# Patient Record
Sex: FEMALE | Race: WHITE | NOT HISPANIC OR LATINO | Employment: UNEMPLOYED | URBAN - METROPOLITAN AREA
[De-identification: names, ages, dates, MRNs, and addresses within clinical notes are randomized per-mention and may not be internally consistent; named-entity substitution may affect disease eponyms.]

---

## 2017-01-26 ENCOUNTER — MYC MEDICAL ADVICE (OUTPATIENT)
Dept: FAMILY MEDICINE | Facility: CLINIC | Age: 57
End: 2017-01-26

## 2017-01-26 ENCOUNTER — TRANSFERRED RECORDS (OUTPATIENT)
Dept: HEALTH INFORMATION MANAGEMENT | Facility: CLINIC | Age: 57
End: 2017-01-26

## 2017-01-26 DIAGNOSIS — J30.2 SEASONAL ALLERGIC RHINITIS, UNSPECIFIED ALLERGIC RHINITIS TRIGGER: Primary | Chronic | ICD-10-CM

## 2017-01-27 RX ORDER — LEVOCETIRIZINE DIHYDROCHLORIDE 5 MG/1
5 TABLET, FILM COATED ORAL EVERY EVENING
Qty: 30 TABLET | Refills: 5 | Status: SHIPPED | OUTPATIENT
Start: 2017-01-27 | End: 2019-02-28

## 2017-02-04 ENCOUNTER — TELEPHONE (OUTPATIENT)
Dept: FAMILY MEDICINE | Facility: CLINIC | Age: 57
End: 2017-02-04

## 2017-02-09 ENCOUNTER — E-VISIT (OUTPATIENT)
Dept: FAMILY MEDICINE | Facility: CLINIC | Age: 57
End: 2017-02-09
Payer: COMMERCIAL

## 2017-02-09 DIAGNOSIS — J01.01 ACUTE RECURRENT MAXILLARY SINUSITIS: Primary | ICD-10-CM

## 2017-02-09 PROCEDURE — 98969 ZZC NONPHYSICIAN ONLINE ASSESSMENT AND MANAGEMENT: CPT | Performed by: PHYSICIAN ASSISTANT

## 2017-02-09 RX ORDER — AZITHROMYCIN 250 MG/1
TABLET, FILM COATED ORAL
Qty: 6 TABLET | Refills: 0 | Status: SHIPPED | OUTPATIENT
Start: 2017-02-09 | End: 2017-02-24

## 2017-02-09 NOTE — TELEPHONE ENCOUNTER
ELECTRONIC VISIT DOCUMENTATION:    SUBJECTIVE:  See message from patient     ASSESSMENT:  1.  Acute maxillary sinusitis, history of seasonal allergies and recurrent infections.  2.        PLAN:  1.  Due to significant allergy history with antibiotics and other intolerances, will trial zpak even though not great coverage for sinusitis, has worked for pt in past.  2.

## 2017-02-24 DIAGNOSIS — J01.01 ACUTE RECURRENT MAXILLARY SINUSITIS: ICD-10-CM

## 2017-02-24 RX ORDER — AZITHROMYCIN 250 MG/1
TABLET, FILM COATED ORAL
Qty: 6 TABLET | Refills: 0 | Status: SHIPPED | OUTPATIENT
Start: 2017-02-24 | End: 2017-02-25

## 2017-02-25 DIAGNOSIS — J01.01 ACUTE RECURRENT MAXILLARY SINUSITIS: Primary | ICD-10-CM

## 2017-02-25 RX ORDER — LEVOFLOXACIN 500 MG/1
500 TABLET, FILM COATED ORAL DAILY
Qty: 10 TABLET | Refills: 0 | Status: SHIPPED | OUTPATIENT
Start: 2017-02-25 | End: 2017-03-21

## 2017-03-15 ENCOUNTER — MYC MEDICAL ADVICE (OUTPATIENT)
Dept: FAMILY MEDICINE | Facility: CLINIC | Age: 57
End: 2017-03-15

## 2017-03-16 NOTE — TELEPHONE ENCOUNTER
Patient Contact    Attempt # 1    Was call answered?  No.  Left message on voicemail with information to call me back. To triage the symptoms she is having below.     Forwarding to provider, to see if what is below is sufficient for them to give recommendations. Should the patient be seen? Does the provider recommend decongestants to help with this problem?   Please review and advise.     Below is the original message from the patient on 2/23/17.   I finished the Zpack on Monday 2/13.     The headaches eased, neck pain gone   by the end of prescription. I know it   Continues to stay in your system and   Work on the infection,  But I don't think   My infection is gone. The headaches   Are back along with sinus  pressure and   swelling. My ears are crackling too   With fluid.  We leave for [place] Saturday 2/25   And I'm concerned about flying.    Flying home from [place]   caused Severe pain in my left ear and I   Felt like I was in a fish bowl for a couple days.   The z pack did mess up by stomach - pain   And diarrhea. I took it with food. Not something   I want to deal with on vacation.   Should I do another round of antibiotics?   Please let me know. Thank you!     Levaquin was prescribed on 2/25/17.

## 2017-03-16 NOTE — TELEPHONE ENCOUNTER
Patient called reporting Fluid in the ears    ENT Symptoms             Symptoms: cc Present Absent Comment   Fever/Chills   x    Fatigue   x    Muscle Aches   x    Eye Irritation   x    Sneezing  x     Nasal Rickey/Drg  x     Sinus Pressure/Pain   x    Loss of smell   x    Dental pain   x    Sore Throat   x    Swollen Glands   x    Ear Pain/Fullness  x     Cough   x    Wheeze   x    Chest Pain   x    Shortness of breath   x    Rash   x    Other             Recent Medication changes: none    Advised: Follow up with clinic to take a look at her ear and help with the drainage.    References used: Telephone Triage Protocols for Nurses fourth edition      Please advise as appropriate with further recommendations as appropriate.    Omaira Isaacs, RN  Triage RN  [facility]

## 2017-03-21 ENCOUNTER — OFFICE VISIT (OUTPATIENT)
Dept: FAMILY MEDICINE | Facility: CLINIC | Age: 57
End: 2017-03-21
Payer: COMMERCIAL

## 2017-03-21 VITALS
DIASTOLIC BLOOD PRESSURE: 70 MMHG | RESPIRATION RATE: 14 BRPM | TEMPERATURE: 97.5 F | SYSTOLIC BLOOD PRESSURE: 120 MMHG | HEIGHT: 64 IN | BODY MASS INDEX: 20.14 KG/M2 | HEART RATE: 84 BPM | WEIGHT: 118 LBS

## 2017-03-21 DIAGNOSIS — R09.81 CONGESTION OF PARANASAL SINUS: Primary | ICD-10-CM

## 2017-03-21 DIAGNOSIS — J30.89 SEASONAL ALLERGIC RHINITIS DUE TO OTHER ALLERGIC TRIGGER: Chronic | ICD-10-CM

## 2017-03-21 PROCEDURE — 99213 OFFICE O/P EST LOW 20 MIN: CPT | Performed by: FAMILY MEDICINE

## 2017-03-21 NOTE — NURSING NOTE
"Chief Complaint   Patient presents with     Ear Problem       Initial /70  Pulse 84  Temp 97.5  F (36.4  C) (Tympanic)  Resp 14  Ht 5' 4\" (1.626 m)  Wt 118 lb (53.5 kg)  BMI 20.25 kg/m2 Estimated body mass index is 20.25 kg/(m^2) as calculated from the following:    Height as of this encounter: 5' 4\" (1.626 m).    Weight as of this encounter: 118 lb (53.5 kg).  Medication Reconciliation: complete     Sue Feldman CMA      "

## 2017-03-21 NOTE — MR AVS SNAPSHOT
After Visit Summary   3/21/2017    Cass Vick    MRN: [id number]           Patient Information     Date Of Birth          1960        Visit Information        Provider Department      3/21/2017 3:30 PM Erickson Mcmahon MD [facility]        Today's Diagnoses     Congestion of paranasal sinus    -  1    Seasonal allergic rhinitis due to other allergic trigger          Care Instructions    I will have the patient is Debrox drops twice daily for the next 5 days.  She'll make a follow-up appointment for 1 week.  Once we get her ear cleared out we might entertain getting a CT scan of her sinuses.  She is virtually bothered year-round with sinus congestion and lots of mucus and drainage.  She is already on Flonase and Zyrtec on a daily basis.  Even with all that treatment she is completely congested most of the time.  May consider allergy testing in the near future also.        Follow-ups after your visit        Follow-up notes from your care team     Return in about 1 week (around 3/28/2017) for Routine Visit.      Your next 10 appointments already scheduled     Mar 28, 2017  2:30 PM CDT   SHORT with Erickson Mcmahon MD   [facility]    [address] [place] [id number] [phone]              Who to contact     If you have questions or need follow up information about today's clinic visit or your schedule please contact [facility] directly at [phone].  Normal or non-critical lab and imaging results will be communicated to you by MyChart, letter or phone within 4 business days after the clinic has received the results. If you do not hear from us within 7 days, please contact the clinic through MyChart or phone. If you have a critical or abnormal lab result, we will notify you by phone as soon as possible.  Submit  "refill requests through [employer] or call your pharmacy and they will forward the refill request to us. Please allow 3 business days for your refill to be completed.          Additional Information About Your Visit        [employer] Information     [employer] gives you secure access to your electronic health record. If you see a primary care provider, you can also send messages to your care team and make appointments. If you have questions, please call your primary care clinic.  If you do not have a primary care provider, please call [phone] and they will assist you.        Care EveryWhere ID     This is your Care EveryWhere ID. This could be used by other organizations to access your [place] medical records  [id number]        Your Vitals Were     Pulse Temperature Respirations Height BMI (Body Mass Index)       84 97.5  F (36.4  C) (Tympanic) 14 5' 4\" (1.626 m) 20.25 kg/m2        Blood Pressure from Last 3 Encounters:   03/21/17 120/70   12/05/16 128/76   08/23/16 118/80    Weight from Last 3 Encounters:   03/21/17 118 lb (53.5 kg)   12/05/16 118 lb (53.5 kg)   08/23/16 119 lb (54 kg)              Today, you had the following     No orders found for display       Primary Care Provider Office Phone # Fax #    Erickson Mcmahon [phone]       [facility] [address] [facility] [address]        Thank you!     Thank you for choosing [facility]  for your care. Our goal is always to provide you with excellent care. Hearing back from our patients is one way we can continue to improve our services. Please take a few minutes to complete the written survey that you may receive in the mail after your visit with us. Thank you!             Your Updated Medication List - Protect others around you: Learn how to safely use, store and throw away your medicines at www.disposemymeds.org.          This list is accurate as of: 3/21/17  4:06 PM.  Always use your " most recent med list.                   Brand Name Dispense Instructions for use    CALCIUM-MAGNESIUM PO      Take 2 capsules by mouth daily       desloratadine 5 MG tablet    CLARINEX    30 tablet    Take 1 tablet (5 mg) by mouth daily       fluticasone 50 MCG/ACT spray    FLONASE    1 Package    Spray 2 sprays into both nostrils daily       levocetirizine 5 MG tablet    XYZAL    30 tablet    Take 1 tablet (5 mg) by mouth every evening       MIRALAX PO      Take by mouth as needed       TYLENOL ARTHRITIS EXT RELIEF OR      Take 500 mg by mouth daily       vitamin D 2000 UNITS Caps      Take 2 capsules by mouth daily       ZYRTEC 10 MG tablet   Generic drug:  cetirizine      Take 10 mg by mouth daily.

## 2017-03-21 NOTE — PROGRESS NOTES
SUBJECTIVE:                                                    Cass Vick is a 56 year old female who presents to clinic today for the following health issues:      ENT Symptoms             Symptoms: cc Present Absent Comment   Fever/Chills   x    Fatigue   x    Muscle Aches   x    Eye Irritation   x    Sneezing   x    Nasal Rickey/Drg  x     Sinus Pressure/Pain  x     Loss of smell   x    Dental pain   x    Sore Throat   x    Swollen Glands   x    Ear Pain/Fullness  x     Cough   x    Wheeze   x    Chest Pain   x    Shortness of breath   x    Rash   x    Other         Symptom duration:  since Feb 10th   Symptom severity:  moderate   Treatments tried:  round of z-rafa and levaquin   Contacts:  unknown             Problem list and histories reviewed & adjusted, as indicated.  Additional history: as documented    Patient Active Problem List   Diagnosis     Irritable bowel syndrome     Seasonal allergic rhinitis     Hyperlipidemia LDL goal <160     Past Surgical History   Procedure Laterality Date     Bunionectomy       Extracorporeal shock wave lithotripsy (eswl)       Ovary surgery       Extracorporeal shock wave lithotripsy, cystoscopy, insert stent ureter(s), combined       Genitourinary surgery       Hysterectomy, pap no longer indicated       Orthopedic surgery       hip, carpal     Ligate vein varicose, phlebectomy multiple stab, combined  1/26/2012     Procedure:COMBINED LIGATE VEIN VARICOSE, PHLEBECTOMY MULTIPLE STAB; LEFT LEG LIGATION, DIVISION, AND STRIPPING OF GREATER SAPHENOUS VEIN (KNEE TO ANKLE), MULTIPLE STAB PHLEBECTOMIES (1 UNIT OF PHLEBECTOMY); Surgeon:NICKO RODRIGUEZ; Location: [facility]     Endoscopic stripping vein(s)       Left leg     Punc/aspir breast cyst       Biopsy breast       Needle biopsy before lump removal.     Lumpectomy breast       Right.       Social History   Substance Use Topics     Smoking status: Never Smoker     Smokeless tobacco: Never Used     Alcohol use Yes       "Comment: 1 glass every 2 weeks     Family History   Problem Relation Age of Onset     Genitourinary Problems Mother      kidney failure on dialysis     Hypertension Mother      Eye Disorder Father      poor vision           Reviewed and updated as needed this visit by clinical staff  Tobacco  Allergies  Meds  Med Hx  Surg Hx  Fam Hx  Soc Hx      Reviewed and updated as needed this visit by Provider         ROS:  Constitutional, HEENT, cardiovascular, pulmonary, gi and gu systems are negative, except as otherwise noted.  ENT/MOUTH: POSITIVE for Hx sinus infections, nasal congestion and sinus pressure    OBJECTIVE:                                                    /70  Pulse 84  Temp 97.5  F (36.4  C) (Tympanic)  Resp 14  Ht 5' 4\" (1.626 m)  Wt 118 lb (53.5 kg)  BMI 20.25 kg/m2  Body mass index is 20.25 kg/(m^2).  GENERAL APPEARANCE: healthy, alert and no distress  EYES: Eyes grossly normal to inspection, PERRL and conjunctivae and sclerae normal  HENT: nose and mouth without ulcers or lesions, TM fluid left and the right TM is completely occluded by very firm looking earwax.  I did try doing an ear wash which was unsuccessful.         ASSESSMENT/PLAN:                                                    No diagnosis found.    Patient Instructions   I will have the patient is Debrox drops twice daily for the next 5 days.  She'll make a follow-up appointment for 1 week.  Once we get her ear cleared out we might entertain getting a CT scan of her sinuses.  She is virtually bothered year-round with sinus congestion and lots of mucus and drainage.  She is already on Flonase and Zyrtec on a daily basis.  Even with all that treatment she is completely congested most of the time.  May consider allergy testing in the near future also.      Erickson Mcmahon MD  [facility]    "

## 2017-03-21 NOTE — PATIENT INSTRUCTIONS
I will have the patient is Debrox drops twice daily for the next 5 days.  She'll make a follow-up appointment for 1 week.  Once we get her ear cleared out we might entertain getting a CT scan of her sinuses.  She is virtually bothered year-round with sinus congestion and lots of mucus and drainage.  She is already on Flonase and Zyrtec on a daily basis.  Even with all that treatment she is completely congested most of the time.  May consider allergy testing in the near future also.

## 2017-03-28 ENCOUNTER — OFFICE VISIT (OUTPATIENT)
Dept: FAMILY MEDICINE | Facility: CLINIC | Age: 57
End: 2017-03-28
Payer: COMMERCIAL

## 2017-03-28 VITALS
HEART RATE: 85 BPM | TEMPERATURE: 98.4 F | RESPIRATION RATE: 14 BRPM | BODY MASS INDEX: 20.25 KG/M2 | SYSTOLIC BLOOD PRESSURE: 114 MMHG | DIASTOLIC BLOOD PRESSURE: 70 MMHG | WEIGHT: 118 LBS

## 2017-03-28 DIAGNOSIS — H65.04 RECURRENT ACUTE SEROUS OTITIS MEDIA OF RIGHT EAR: Primary | ICD-10-CM

## 2017-03-28 PROCEDURE — 99213 OFFICE O/P EST LOW 20 MIN: CPT | Performed by: FAMILY MEDICINE

## 2017-03-28 RX ORDER — SULFAMETHOXAZOLE AND TRIMETHOPRIM 400; 80 MG/1; MG/1
1 TABLET ORAL 2 TIMES DAILY
Qty: 10 TABLET | Refills: 0 | Status: SHIPPED | OUTPATIENT
Start: 2017-03-28 | End: 2017-04-02

## 2017-03-28 NOTE — PROGRESS NOTES
SUBJECTIVE:                                                    Cass Vick is a 56 year old female who presents to clinic today for the following health issues:      Ear Problem      Duration: since March 5th    Description (location/character/radiation): rt ear    Intensity:  mild    Accompanying signs and symptoms: pain and fluid    History (similar episodes/previous evaluation): using de brux past week for ear wash today    Precipitating or alleviating factors: None    Therapies tried and outcome: oral decongestant           Problem list and histories reviewed & adjusted, as indicated.  Additional history: as documented    Patient Active Problem List   Diagnosis     Irritable bowel syndrome     Seasonal allergic rhinitis     Hyperlipidemia LDL goal <160     Past Surgical History:   Procedure Laterality Date     BIOPSY BREAST      Needle biopsy before lump removal.     BUNIONECTOMY       ENDOSCOPIC STRIPPING VEIN(S)      Left leg     EXTRACORPOREAL SHOCK WAVE LITHOTRIPSY (ESWL)       EXTRACORPOREAL SHOCK WAVE LITHOTRIPSY, CYSTOSCOPY, INSERT STENT URETER(S), COMBINED       GENITOURINARY SURGERY       HYSTERECTOMY, PAP NO LONGER INDICATED       LIGATE VEIN VARICOSE, PHLEBECTOMY MULTIPLE STAB, COMBINED  1/26/2012    Procedure:COMBINED LIGATE VEIN VARICOSE, PHLEBECTOMY MULTIPLE STAB; LEFT LEG LIGATION, DIVISION, AND STRIPPING OF GREATER SAPHENOUS VEIN (KNEE TO ANKLE), MULTIPLE STAB PHLEBECTOMIES (1 UNIT OF PHLEBECTOMY); Surgeon:NICKO RODRIGUEZ; Location:[facility]     LUMPECTOMY BREAST      Right.     ORTHOPEDIC SURGERY      hip, carpal     OVARY SURGERY       PUNC/ASPIR BREAST CYST         Social History   Substance Use Topics     Smoking status: Never Smoker     Smokeless tobacco: Never Used     Alcohol use Yes      Comment: 1 glass every 2 weeks     Family History   Problem Relation Age of Onset     Genitourinary Problems Mother      kidney failure on dialysis     Hypertension Mother      Eye Disorder  Father      poor vision           Reviewed and updated as needed this visit by clinical staff  Tobacco  Allergies  Meds  Med Hx  Surg Hx  Fam Hx  Soc Hx      Reviewed and updated as needed this visit by Provider         ROS:  Constitutional, HEENT, cardiovascular, pulmonary, gi and gu systems are negative, except as otherwise noted.    OBJECTIVE:                                                    /70  Pulse 85  Temp 98.4  F (36.9  C) (Tympanic)  Resp 14  Wt 118 lb (53.5 kg)  BMI 20.25 kg/m2  Body mass index is 20.25 kg/(m^2).  GENERAL APPEARANCE: healthy, alert and no distress  HENT: ear canals and TM's normal after ear wash on the right, nose and mouth without ulcers or lesions and sinuses are nontender  NECK: no adenopathy and no asymmetry, masses, or scars         ASSESSMENT/PLAN:                                                        ICD-10-CM    1. Recurrent acute serous otitis media of right ear H65.04 sulfamethoxazole-trimethoprim (BACTRIM/SEPTRA) 400-80 MG per tablet       Patient Instructions   The right eardrum was just a little bit red after her ear wash.  I think the redness was actually from the washing.  Patient is nervous as she is traveling starting this Saturday on an airplane down to [place].  We made a compromise, and the fact that I gave her Septra to take for 5 days should she get ear pain on her flight.  She is having chronic sinus issues and may need to go back to see a different ear nose and throat specialist.  She will let me know when she returns how things went.      Erickson Mcmahon MD  [facility]

## 2017-03-29 NOTE — PATIENT INSTRUCTIONS
The right eardrum was just a little bit red after her ear wash.  I think the redness was actually from the washing.  Patient is nervous as she is traveling starting this Saturday on an airplane down to [place].  We made a compromise, and the fact that I gave her Septra to take for 5 days should she get ear pain on her flight.  She is having chronic sinus issues and may need to go back to see a different ear nose and throat specialist.  She will let me know when she returns how things went.

## 2017-06-29 ENCOUNTER — TRANSFERRED RECORDS (OUTPATIENT)
Dept: HEALTH INFORMATION MANAGEMENT | Facility: CLINIC | Age: 57
End: 2017-06-29

## 2017-06-29 DIAGNOSIS — M25.559 PAIN IN JOINT, PELVIC REGION AND THIGH: Primary | ICD-10-CM

## 2017-06-29 LAB
CRP SERPL-MCNC: <2.9 MG/L (ref 0–8)
ERYTHROCYTE [DISTWIDTH] IN BLOOD BY AUTOMATED COUNT: 12.9 % (ref 10–15)
ERYTHROCYTE [SEDIMENTATION RATE] IN BLOOD BY WESTERGREN METHOD: 7 MM/H (ref 0–30)
HCT VFR BLD AUTO: 44 % (ref 35–47)
HGB BLD-MCNC: 14.4 G/DL (ref 11.7–15.7)
MCH RBC QN AUTO: 31.5 PG (ref 26.5–33)
MCHC RBC AUTO-ENTMCNC: 32.7 G/DL (ref 31.5–36.5)
MCV RBC AUTO: 96 FL (ref 78–100)
MISCELLANEOUS TEST: NORMAL
PLATELET # BLD AUTO: 216 10E9/L (ref 150–450)
RBC # BLD AUTO: 4.57 10E12/L (ref 3.8–5.2)
WBC # BLD AUTO: 8 10E9/L (ref 4–11)

## 2017-06-29 PROCEDURE — 99000 SPECIMEN HANDLING OFFICE-LAB: CPT | Performed by: ORTHOPAEDIC SURGERY

## 2017-06-29 PROCEDURE — 83018 HEAVY METAL QUAN EACH NES: CPT | Mod: 90 | Performed by: ORTHOPAEDIC SURGERY

## 2017-06-29 PROCEDURE — 82495 ASSAY OF CHROMIUM: CPT | Mod: 90 | Performed by: ORTHOPAEDIC SURGERY

## 2017-06-29 PROCEDURE — 85027 COMPLETE CBC AUTOMATED: CPT | Performed by: ORTHOPAEDIC SURGERY

## 2017-06-29 PROCEDURE — 86140 C-REACTIVE PROTEIN: CPT | Performed by: ORTHOPAEDIC SURGERY

## 2017-06-29 PROCEDURE — 85652 RBC SED RATE AUTOMATED: CPT | Performed by: ORTHOPAEDIC SURGERY

## 2017-06-29 PROCEDURE — 36415 COLL VENOUS BLD VENIPUNCTURE: CPT | Performed by: ORTHOPAEDIC SURGERY

## 2017-06-30 LAB
COBALT SERPL-MCNC: NORMAL UG/L
CR SERPL-MCNC: NORMAL NG/ML

## 2017-07-25 ENCOUNTER — HOSPITAL ENCOUNTER (OUTPATIENT)
Dept: GENERAL RADIOLOGY | Facility: CLINIC | Age: 57
Discharge: HOME | End: 2017-07-25
Attending: ORTHOPAEDIC SURGERY | Admitting: ORTHOPAEDIC SURGERY
Payer: COMMERCIAL

## 2017-07-25 VITALS — SYSTOLIC BLOOD PRESSURE: 123 MMHG | DIASTOLIC BLOOD PRESSURE: 86 MMHG | HEART RATE: 90 BPM | OXYGEN SATURATION: 99 %

## 2017-07-25 DIAGNOSIS — Z96.641 H/O TOTAL HIP ARTHROPLASTY, RIGHT: ICD-10-CM

## 2017-07-25 DIAGNOSIS — M25.551 PAIN OF RIGHT HIP JOINT: ICD-10-CM

## 2017-07-25 LAB
APPEARANCE FLD: NORMAL
BASOPHILS NFR FLD MANUAL: 2 %
COLOR FLD: NORMAL
EOSINOPHIL NFR FLD MANUAL: 2 %
GRAM STN SPEC: NORMAL
LYMPHOCYTES NFR FLD MANUAL: 37 %
MICRO REPORT STATUS: NORMAL
MONOS+MACROS NFR FLD MANUAL: 2 %
NEUTS BAND NFR FLD MANUAL: 57 %
SPECIMEN SOURCE FLD: NORMAL
SPECIMEN SOURCE: NORMAL
WBC # FLD AUTO: NORMAL /UL

## 2017-07-25 PROCEDURE — 87205 SMEAR GRAM STAIN: CPT | Performed by: ORTHOPAEDIC SURGERY

## 2017-07-25 PROCEDURE — 87186 SC STD MICRODIL/AGAR DIL: CPT | Performed by: ORTHOPAEDIC SURGERY

## 2017-07-25 PROCEDURE — 87077 CULTURE AEROBIC IDENTIFY: CPT | Performed by: ORTHOPAEDIC SURGERY

## 2017-07-25 PROCEDURE — 27211111 XR JOINT ASPIRATION MAJOR RIGHT

## 2017-07-25 PROCEDURE — 89051 BODY FLUID CELL COUNT: CPT | Performed by: ORTHOPAEDIC SURGERY

## 2017-07-25 PROCEDURE — 87075 CULTR BACTERIA EXCEPT BLOOD: CPT | Performed by: ORTHOPAEDIC SURGERY

## 2017-07-25 PROCEDURE — 25000125 ZZHC RX 250: Performed by: PHYSICIAN ASSISTANT

## 2017-07-25 PROCEDURE — 87070 CULTURE OTHR SPECIMN AEROBIC: CPT | Performed by: ORTHOPAEDIC SURGERY

## 2017-07-25 RX ORDER — LIDOCAINE HYDROCHLORIDE 10 MG/ML
30 INJECTION, SOLUTION EPIDURAL; INFILTRATION; INTRACAUDAL; PERINEURAL ONCE
Status: COMPLETED | OUTPATIENT
Start: 2017-07-25 | End: 2017-07-25

## 2017-07-25 RX ORDER — IOPAMIDOL 408 MG/ML
10 INJECTION, SOLUTION INTRATHECAL ONCE
Status: DISCONTINUED | OUTPATIENT
Start: 2017-07-25 | End: 2017-07-25 | Stop reason: CLARIF

## 2017-07-25 RX ADMIN — LIDOCAINE HYDROCHLORIDE 3 ML: 10 INJECTION, SOLUTION EPIDURAL; INFILTRATION; INTRACAUDAL; PERINEURAL at 09:36

## 2017-07-25 NOTE — PROGRESS NOTES
RADIOLOGY PROCEDURE NOTE  Patient name: Cass Vick  MRN: [id number]  : 1960    Pre-procedure diagnosis: Right hip pain; post GERSON  Post-procedure diagnosis: Same    Procedure Date/Time: 2017  9:49 AM  Procedure: Right hip aspiration  Estimated blood loss: None  Specimen(s) collected with description: 2 cc bloody synovial fluid  The patient tolerated the procedure well with no immediate complications.  Significant findings:none    See imaging dictation for procedural details.    Provider name: Eligio Kuhn  Assistant(s):None

## 2017-07-25 NOTE — DISCHARGE INSTRUCTIONS
Orthopedic Discharge Instructions:   After Your Injection or Aspiration  ________________________________________    Patient Name:  Cass Vick  Today's Date:  July 25, 2017  The doctor who performed your Joint Aspiration was Eligio Kuhn PA-C at [facility]  in the  General Radiology Department  Care of needle site    If you have new numbness down your leg, this may last up to 6 hours, but it should go away. You may need help with walking until your leg feels normal.     Over the next 24 to 48 hours, pain at the needle site may increase before it gets better.     For the next 48 hours, use ice packs for 15 minutes, three to four times a day for pain.    If you have a bandage, you may remove it the next morning.     No tub baths, hot tubs or swimming for 48 hours. You may shower the next day.   Activity    Do not drive until morning.     Limit your activity based on your pain level. Follow your doctor s orders for activity.     You may eat a normal diet.     If you had sedation,   - You may feel sleepy, forgetful or unsteady.   - Do not drink alcohol for 24 hours.  Medicines    If you take aspirin or platelet inhibitors, you can restart them tomorrow.     Restart all other medicines today at your regular dose, including Coumadin (warfarin).    If you are restarting Coumadin, talk to your doctor about having your INR checked.   If you had a steroid shot     The medicine should help reduce swelling and pain. This may take from 7 to 10 days.     Side effects from the shot will be mild and go away in 2 to 3 days. Common side effects may include:  -  Insomnia (trouble sleeping).  -  Heartburn.  -  Flushed face.  -  Water retention (bloating or fluid build-up).  -  Increased appetite (feeling more hungry than usual).  -  Increased blood sugar.  If you have diabetes, watch your blood sugar closely. If needed, call your doctor to help you control your blood sugar.  Some patients will get  lasting relief from a single shot. Others may require up to three shots to get results. If you have more than one steroid shot, they should be given two weeks apart.  Some patients do not have relief of symptoms.    Follow-up:  No follow-up needed     Call your doctor or go to the Emergency Room if you have severe pain, fever or problems with bowel or bladder control.

## 2017-07-25 NOTE — IP AVS SNAPSHOT
MRN:[id number]                      After Visit Summary   7/25/2017    Cass Vick    MRN: [id number]           Visit Information        Provider Department      7/25/2017  9:00 AM [facility] RAD; [facility] [place] [facility]           Review of your medicines      UNREVIEWED medicines. Ask your doctor about these medicines        Dose / Directions    CALCIUM-MAGNESIUM PO   Used for:  Hip pain, right        Dose:  2 capsule   Take 2 capsules by mouth daily   Refills:  0       desloratadine 5 MG tablet   Commonly known as:  CLARINEX   Used for:  Seasonal allergic rhinitis, unspecified allergic rhinitis trigger        Dose:  5 mg   Take 1 tablet (5 mg) by mouth daily   Quantity:  30 tablet   Refills:  0       fluticasone 50 MCG/ACT spray   Commonly known as:  FLONASE   Used for:  Chronic rhinitis        Dose:  2 spray   Spray 2 sprays into both nostrils daily   Quantity:  1 Package   Refills:  11       levocetirizine 5 MG tablet   Commonly known as:  XYZAL   Used for:  Seasonal allergic rhinitis, unspecified allergic rhinitis trigger        Dose:  5 mg   Take 1 tablet (5 mg) by mouth every evening   Quantity:  30 tablet   Refills:  5       MIRALAX PO   Used for:  Irritable bowel syndrome        Take by mouth as needed   Refills:  0       TYLENOL ARTHRITIS EXT RELIEF OR   Used for:  Hip pain, right        Dose:  500 mg   Take 500 mg by mouth daily   Refills:  0       vitamin D 2000 UNITS Caps   Used for:  Hip pain, right        Dose:  2 capsule   Take 2 capsules by mouth daily   Refills:  0       ZYRTEC 10 MG tablet   Generic drug:  cetirizine        Dose:  10 mg   Take 10 mg by mouth daily.   Refills:  0                Protect others around you: Learn how to safely use, store and throw away your medicines at www.disposemymeds.org.         Follow-ups after your visit        Your next 10 appointments already scheduled     Jul 25, 2017  9:00 AM CDT   XR JOINT ASPIRATION MAJOR RT with [facility],  [facility] ([facility])    [address] [address] [phone]           Stop drinking 1 hour before the exam.  You may take your medicines as usual, except for blood thinners (Coumadin, Plavix, Ticlid, Persantine, Aggrenox, Pletal, Effient, Brilliant). Talk to your doctor if you take these.  Tell your doctor if:   You have ever had an allergic reaction to X-ray dye (contrast fluid).   There is a chance you may be pregnant.  Please bring a list of your current medicines to your exam. Include vitamins, minerals and over-the-counter medicines.  Please call the Imaging Department at your exam site with any questions.               Care Instructions        Further instructions from your care team         Orthopedic Discharge Instructions:   After Your Injection or Aspiration  ________________________________________    Patient Name:  Cass Vick  Today's Date:  July 25, 2017  The doctor who performed your Joint Aspiration was Eligio Kuhn PA-C at [facility]  in the  General Radiology Department  Care of needle site    If you have new numbness down your leg, this may last up to 6 hours, but it should go away. You may need help with walking until your leg feels normal.     Over the next 24 to 48 hours, pain at the needle site may increase before it gets better.     For the next 48 hours, use ice packs for 15 minutes, three to four times a day for pain.    If you have a bandage, you may remove it the next morning.     No tub baths, hot tubs or swimming for 48 hours. You may shower the next day.   Activity    Do not drive until morning.     Limit your activity based on your pain level. Follow your doctor s orders for activity.     You may eat a normal diet.     If you had sedation,   - You may feel sleepy, forgetful or unsteady.   - Do not drink alcohol for 24 hours.  Medicines    If you take aspirin or platelet  inhibitors, you can restart them tomorrow.     Restart all other medicines today at your regular dose, including Coumadin (warfarin).    If you are restarting Coumadin, talk to your doctor about having your INR checked.   If you had a steroid shot     The medicine should help reduce swelling and pain. This may take from 7 to 10 days.     Side effects from the shot will be mild and go away in 2 to 3 days. Common side effects may include:  -  Insomnia (trouble sleeping).  -  Heartburn.  -  Flushed face.  -  Water retention (bloating or fluid build-up).  -  Increased appetite (feeling more hungry than usual).  -  Increased blood sugar.  If you have diabetes, watch your blood sugar closely. If needed, call your doctor to help you control your blood sugar.  Some patients will get lasting relief from a single shot. Others may require up to three shots to get results. If you have more than one steroid shot, they should be given two weeks apart.  Some patients do not have relief of symptoms.    Follow-up:  No follow-up needed     Call your doctor or go to the Emergency Room if you have severe pain, fever or problems with bowel or bladder control.      Additional Information About Your Visit        [facility] Information     [employer] gives you secure access to your electronic health record. If you see a primary care provider, you can also send messages to your care team and make appointments. If you have questions, please call your primary care clinic.  If you do not have a primary care provider, please call [phone] and they will assist you.        Care EveryWhere ID     This is your Care EveryWhere ID. This could be used by other organizations to access your [facility] medical records  [id number]         Primary Care Provider Office Phone # Fax #    Erickson Mcmahon [phone]      Equal Access to Services     MARY LOU BUSTILLO : gio Schroeder qaybta kaalmada adeegyada,  tawana mayerryanne curiel'aan ah. So [facility] [phone].    ATENCIÓN: Si elvirala zia, tiene a lan disposición servicios gratuitos de asistencia lingüística. Mat ham [phone].    We comply with applicable federal civil rights laws and [place] laws. We do not discriminate on the basis of race, color, national origin, age, disability sex, sexual orientation or gender identity.            Thank you!     Thank you for choosing [facility] for your care. Our goal is always to provide you with excellent care. Hearing back from our patients is one way we can continue to improve our services. Please take a few minutes to complete the written survey that you may receive in the mail after you visit with us. Thank you!             Medication List: This is a list of all your medications and when to take them. Check marks below indicate your daily home schedule. Keep this list as a reference.      Medications           Morning Afternoon Evening Bedtime As Needed    CALCIUM-MAGNESIUM PO   Take 2 capsules by mouth daily                                desloratadine 5 MG tablet   Commonly known as:  CLARINEX   Take 1 tablet (5 mg) by mouth daily                                fluticasone 50 MCG/ACT spray   Commonly known as:  FLONASE   Spray 2 sprays into both nostrils daily                                levocetirizine 5 MG tablet   Commonly known as:  XYZAL   Take 1 tablet (5 mg) by mouth every evening                                MIRALAX PO   Take by mouth as needed                                TYLENOL ARTHRITIS EXT RELIEF OR   Take 500 mg by mouth daily                                vitamin D 2000 UNITS Caps   Take 2 capsules by mouth daily                                ZYRTEC 10 MG tablet   Take 10 mg by mouth daily.   Generic drug:  cetirizine

## 2017-07-25 NOTE — IP AVS SNAPSHOT
[facility]    [address] [address]    Phone:  [phone]                                       After Visit Summary   7/25/2017    Cass Vick    MRN: [id number]           After Visit Summary Signature Page     I have received my discharge instructions, and my questions have been answered. I have discussed any challenges I see with this plan with the nurse or doctor.    ..........................................................................................................................................  Patient/Patient Representative Signature      ..........................................................................................................................................  Patient Representative Print Name and Relationship to Patient    ..................................................               ................................................  Date                                            Time    ..........................................................................................................................................  Reviewed by Signature/Title    ...................................................              ..............................................  Date                                                            Time

## 2017-07-30 LAB
BACTERIA SPEC CULT: NO GROWTH
MICRO REPORT STATUS: NORMAL
SPECIMEN SOURCE: NORMAL

## 2017-08-08 LAB
BACTERIA SPEC CULT: ABNORMAL
Lab: ABNORMAL
MICRO REPORT STATUS: ABNORMAL
MICROORGANISM SPEC CULT: ABNORMAL
SPECIMEN SOURCE: ABNORMAL

## 2018-05-15 ENCOUNTER — TRANSFERRED RECORDS (OUTPATIENT)
Dept: HEALTH INFORMATION MANAGEMENT | Facility: CLINIC | Age: 58
End: 2018-05-15

## 2019-02-28 ENCOUNTER — OFFICE VISIT (OUTPATIENT)
Dept: FAMILY MEDICINE | Facility: CLINIC | Age: 59
End: 2019-02-28
Payer: COMMERCIAL

## 2019-02-28 VITALS
HEART RATE: 91 BPM | HEIGHT: 64 IN | BODY MASS INDEX: 20.66 KG/M2 | DIASTOLIC BLOOD PRESSURE: 74 MMHG | TEMPERATURE: 98.4 F | RESPIRATION RATE: 14 BRPM | SYSTOLIC BLOOD PRESSURE: 122 MMHG | OXYGEN SATURATION: 97 % | WEIGHT: 121 LBS

## 2019-02-28 DIAGNOSIS — M51.27 HERNIATED NUCLEUS PULPOSUS OF LUMBOSACRAL REGION: ICD-10-CM

## 2019-02-28 DIAGNOSIS — M47.26 OTHER SPONDYLOSIS WITH RADICULOPATHY, LUMBAR REGION: ICD-10-CM

## 2019-02-28 DIAGNOSIS — Z01.818 PREOP GENERAL PHYSICAL EXAM: Primary | ICD-10-CM

## 2019-02-28 LAB
ERYTHROCYTE [DISTWIDTH] IN BLOOD BY AUTOMATED COUNT: 12.5 % (ref 10–15)
HCT VFR BLD AUTO: 40.3 % (ref 35–47)
HGB BLD-MCNC: 13 G/DL (ref 11.7–15.7)
MCH RBC QN AUTO: 30.7 PG (ref 26.5–33)
MCHC RBC AUTO-ENTMCNC: 32.3 G/DL (ref 31.5–36.5)
MCV RBC AUTO: 95 FL (ref 78–100)
PLATELET # BLD AUTO: 234 10E9/L (ref 150–450)
RBC # BLD AUTO: 4.23 10E12/L (ref 3.8–5.2)
WBC # BLD AUTO: 7 10E9/L (ref 4–11)

## 2019-02-28 PROCEDURE — 99214 OFFICE O/P EST MOD 30 MIN: CPT | Performed by: PHYSICIAN ASSISTANT

## 2019-02-28 PROCEDURE — 85027 COMPLETE CBC AUTOMATED: CPT | Performed by: PHYSICIAN ASSISTANT

## 2019-02-28 PROCEDURE — 36415 COLL VENOUS BLD VENIPUNCTURE: CPT | Performed by: PHYSICIAN ASSISTANT

## 2019-02-28 ASSESSMENT — MIFFLIN-ST. JEOR: SCORE: 1108.85

## 2019-02-28 NOTE — PROGRESS NOTES
[facility]  [address]  [place] [address]  [phone]  Dept: [phone]    PRE-OP EVALUATION:  Today's date: 2019    Cass Vick (: 1960) presents for pre-operative evaluation assessment as requested by Dr. Jules Tan.  She requires evaluation and anesthesia risk assessment prior to undergoing surgery/procedure for treatment of Revision decompression foraminotomy L5s1.    Fax number for surgical facility: [phone]  Primary Physician: Erickson Mcmahon  Type of Anesthesia Anticipated: to be determined    Patient has a Health Care Directive or Living Will:  NO    Preop Questions 2019   Who is doing your surgery? Dr Jules Tan   What are you having done? Revision decompression foraminotomy L5s1   Date of Surgery/Procedure: 3/4/2019   Facility or Hospital where procedure/surgery will be performed: [facility]   1.  Do you have a history of Heart attack, stroke, stent, coronary bypass surgery, or other heart surgery? No   2.  Do you ever have any pain or discomfort in your chest? No   3.  Do you have a history of  Heart Failure? No   4.   Are you troubled by shortness of breath when:  walking on a level surface, or up a slight hill, or at night? No   5.  Do you currently have a cold, bronchitis or other respiratory infection? No   6.  Do you have a cough, shortness of breath, or wheezing? No   7.  Do you sometimes get pains in the calves of your legs when you walk? No   8. Do you or anyone in your family have previous history of blood clots? No   9.  Do you or does anyone in your family have a serious bleeding problem such as prolonged bleeding following surgeries or cuts? No   10. Have you ever had problems with anemia or been told to take iron pills? YES - 6 years ago after surgery   11. Have you had any abnormal blood loss such as black, tarry or bloody stools, or abnormal vaginal bleeding? No    12. Have you ever had a blood transfusion? YES - after surgery   13. Have you or any of your relatives ever had problems with anesthesia? YES - self, BP drops low, usually passes out, nausea and vomiting   14. Do you have sleep apnea, excessive snoring or daytime drowsiness? No   15. Do you have any prosthetic heart valves? No   16. Do you have prosthetic joints? YES - right hip and artificial disc in neck and screws in feet    17. Is there any chance that you may be pregnant? No         HPI:     HPI related to upcoming procedure: Left sided leg pain secondary to radiculopathy due to bone regrowth after surgery two years ago.        See problem list for active medical problems.  Problems all longstanding and stable, except as noted/documented.  See ROS for pertinent symptoms related to these conditions.                       MEDICAL HISTORY:     Patient Active Problem List    Diagnosis Date Noted     Hyperlipidemia LDL goal <160 10/03/2013     Priority: Medium     The 10-year ASCVD risk score (Zhen WALTERS Jr., et al., 2013) is: 2.4%    Values used to calculate the score:      Age: 56 years      Sex: Female      Is an : No      Diabetic: No      Tobacco smoker: No      Systolic Blood Pressure: 118 mmHg      Prescribed Antihypertensives: No      HDL Cholesterol: 51 mg/dL      Total Cholesterol: 220 mg/dL         Seasonal allergic rhinitis      Priority: Medium     Irritable bowel syndrome 01/01/2010     Priority: Medium     Gluten and lactose free (except for small amount of greek yogurt)        Past Medical History:   Diagnosis Date     Allergic rhinitis      Anxiety      Breast mass     Right.     Cyst of breast     Left     Depression      Dyspareunia      Fibromyositis      Irritable bowel      Panic disorder with agoraphobia      PONV (postoperative nausea and vomiting)      Seasonal allergies      Past Surgical History:   Procedure Laterality Date     BIOPSY BREAST      Needle biopsy before lump  removal.     BUNIONECTOMY       ENDOSCOPIC STRIPPING VEIN(S)      Left leg     EXTRACORPOREAL SHOCK WAVE LITHOTRIPSY (ESWL)       EXTRACORPOREAL SHOCK WAVE LITHOTRIPSY, CYSTOSCOPY, INSERT STENT URETER(S), COMBINED       GENITOURINARY SURGERY       HYSTERECTOMY, PAP NO LONGER INDICATED       LIGATE VEIN VARICOSE, PHLEBECTOMY MULTIPLE STAB, COMBINED  1/26/2012    Procedure:COMBINED LIGATE VEIN VARICOSE, PHLEBECTOMY MULTIPLE STAB; LEFT LEG LIGATION, DIVISION, AND STRIPPING OF GREATER SAPHENOUS VEIN (KNEE TO ANKLE), MULTIPLE STAB PHLEBECTOMIES (1 UNIT OF PHLEBECTOMY); Surgeon:NICKO RODRIGUEZ; Location:[facility]     LUMPECTOMY BREAST      Right.     ORTHOPEDIC SURGERY      hip, carpal     OVARY SURGERY       PUNC/ASPIR BREAST CYST       Current Outpatient Medications   Medication Sig Dispense Refill     Acetaminophen (TYLENOL ARTHRITIS EXT RELIEF OR) Take 500 mg by mouth daily       CALCIUM-MAGNESIUM PO Take 2 capsules by mouth daily       cetirizine (ZYRTEC) 10 MG tablet Take 10 mg by mouth daily.       Cholecalciferol (VITAMIN D) 2000 UNITS CAPS Take 2 capsules by mouth daily       desloratadine (CLARINEX) 5 MG tablet Take 1 tablet (5 mg) by mouth daily 30 tablet 0     fluticasone (FLONASE) 50 MCG/ACT nasal spray Spray 2 sprays into both nostrils daily 1 Package 11     levocetirizine (XYZAL) 5 MG tablet Take 1 tablet (5 mg) by mouth every evening 30 tablet 5     Polyethylene Glycol 3350 (MIRALAX PO) Take by mouth as needed       OTC products: None, except as noted above    Allergies   Allergen Reactions     Doxycycline GI Disturbance     Erythromycin GI Disturbance     Vicodin [Hydrocodone-Acetaminophen]      Heart races     Amoxicillin      Other reaction(s): Edema  Facial  Facial swelling, unsure if cause.  Other PNC okay      Latex Allergy: NO    Social History     Tobacco Use     Smoking status: Never Smoker     Smokeless tobacco: Never Used   Substance Use Topics     Alcohol use: Yes     Comment: 1 glass every 2  "weeks     History   Drug Use No       REVIEW OF SYSTEMS:   CONSTITUTIONAL: NEGATIVE for fever, chills, change in weight  INTEGUMENTARY/SKIN: NEGATIVE for worrisome rashes, moles or lesions  EYES: NEGATIVE for vision changes or irritation  ENT/MOUTH: NEGATIVE for ear, mouth and throat problems  RESP: NEGATIVE for significant cough or SOB  BREAST: NEGATIVE for masses, tenderness or discharge  CV: NEGATIVE for chest pain, palpitations or peripheral edema  GI: NEGATIVE for nausea, abdominal pain, heartburn, or change in bowel habits  : NEGATIVE for frequency, dysuria, or hematuria  MUSCULOSKELETAL:POSITIVE  for Left leg pain  NEURO: NEGATIVE for weakness, dizziness or paresthesias  ENDOCRINE: NEGATIVE for temperature intolerance, skin/hair changes  HEME: NEGATIVE for bleeding problems  PSYCHIATRIC: NEGATIVE for changes in mood or affect    EXAM:   /74   Pulse 91   Temp 98.4  F (36.9  C) (Tympanic)   Resp 14   Ht 1.626 m (5' 4\")   Wt 54.9 kg (121 lb)   SpO2 97%   BMI 20.77 kg/m      GENERAL APPEARANCE: healthy, alert and no distress     EYES: EOMI, PERRL     HENT: ear canals and TM's normal and nose and mouth without ulcers or lesions     NECK: no adenopathy, no asymmetry, masses, or scars and thyroid normal to palpation     RESP: lungs clear to auscultation - no rales, rhonchi or wheezes     CV: regular rates and rhythm, normal S1 S2, no S3 or S4 and no murmur, click or rub     ABDOMEN:  soft, nontender, no HSM or masses and bowel sounds normal     MS: extremities normal- no gross deformities noted, no evidence of inflammation in joints, FROM in all extremities.     SKIN: no suspicious lesions or rashes     NEURO: Normal strength and tone, sensory exam grossly normal, mentation intact and speech normal     PSYCH: mentation appears normal. and affect normal/bright     LYMPHATICS: No cervical adenopathy    DIAGNOSTICS:     Labs Resulted Today:   Results for orders placed or performed in visit on 02/28/19 "   CBC with platelets   Result Value Ref Range    WBC 7.0 4.0 - 11.0 10e9/L    RBC Count 4.23 3.8 - 5.2 10e12/L    Hemoglobin 13.0 11.7 - 15.7 g/dL    Hematocrit 40.3 35.0 - 47.0 %    MCV 95 78 - 100 fl    MCH 30.7 26.5 - 33.0 pg    MCHC 32.3 31.5 - 36.5 g/dL    RDW 12.5 10.0 - 15.0 %    Platelet Count 234 150 - 450 10e9/L         Recent Labs   Lab Test 06/29/17  1300 12/05/16  1007  10/03/13  1037  07/08/11  0546   HGB 14.4 13.6   < > 13.6   < > 8.8*    202   < > 197   < >  --    NA  --   --   --  141  --  140   POTASSIUM  --   --   --  4.0  --  3.8   CR  --  0.91  --  0.80  --  0.60    < > = values in this interval not displayed.        IMPRESSION:   Reason for surgery/procedure: Left leg pain, radiculopathy  Diagnosis/reason for consult: Evaluation and anesthesia risk assessment prior to  Revision Decompression, Foraminotomy Levels L5-S1    The proposed surgical procedure is considered LOW risk.    REVISED CARDIAC RISK INDEX  The patient has the following serious cardiovascular risks for perioperative complications such as (MI, PE, VFib and 3  AV Block):  No serious cardiac risks  INTERPRETATION: 0 risks: Class I (very low risk - 0.4% complication rate)    The patient has the following additional risks for perioperative complications:  No identified additional risks      ICD-10-CM    1. Preop general physical exam Z01.818 CBC with platelets   2. Herniated nucleus pulposus of lumbosacral region M51.27    3. Other spondylosis with radiculopathy, lumbar region M47.26        RECOMMENDATIONS:       --Patient is to take all scheduled medications on the day of surgery.    APPROVAL GIVEN to proceed with proposed procedure, without further diagnostic evaluation       Signed Electronically by: Fatoumata Grissom PA-C    Copy of this evaluation report is provided to requesting physician.    [facility] Preop Guidelines    Revised Cardiac Risk Index   Yes

## 2019-04-13 NOTE — NURSING NOTE
"Chief Complaint   Patient presents with     Ear Problem     RT ear wash       Initial /70  Pulse 85  Temp 98.4  F (36.9  C) (Tympanic)  Resp 14  Wt 118 lb (53.5 kg)  BMI 20.25 kg/m2 Estimated body mass index is 20.25 kg/(m^2) as calculated from the following:    Height as of 3/21/17: 5' 4\" (1.626 m).    Weight as of this encounter: 118 lb (53.5 kg).  Medication Reconciliation: complete     Sue Feldman CMA      " RN reviewed d/c instructions with pt-pt stated understanding. Follow up appt in 2 weeks and again in 6 weeks to be made with OB. Pt received prescriptions/ meds. Stable condition. Ok to d/c home today

## 2019-05-22 ENCOUNTER — TELEPHONE (OUTPATIENT)
Dept: FAMILY MEDICINE | Facility: CLINIC | Age: 59
End: 2019-05-22

## 2019-05-22 NOTE — TELEPHONE ENCOUNTER
Panel Management Review      Patient has the following on her problem list: None      Composite cancer screening  Chart review shows that this patient is due/due soon for the following Mammogram  Summary:    Patient is due/failing the following:   MAMMOGRAM    Action needed:   Patient needs referral/order: mammo    Type of outreach:    Sent letter.    Questions for provider review:    None

## 2019-05-22 NOTE — LETTER
May 22, 2019    Cass Vick  [address] [facility] [address]    Dear Andreea Ramon cares about your health and your health plan.  I have reviewed your medical conditions, medication list and lab results, and am making recommendations based on this review to better manage your health.    You are in particular need of attention regarding:  -Breast Cancer Screening    I am recommending that you:     -schedule a MAMMOGRAM which is due.    1 in 8 women will develop invasive breast cancer during her lifetime and it is the most common non-skin cancer in American women.  EARLY detection, new treatments, and a better understanding of the disease have increased survival rates - the 5 year survival rate in the 1960s was 63% and today it is close to 90%.    If you are under/uninsured, we recommend you contact the [facility]. They offer mammograms at no charge or on a sliding fee charge. You can schedule with them at [phone]. Please have them send us the results.      Please disregard this reminder if you have had this exam elsewhere within the last year.  It would be helpful for us to have a copy of your mammogram report in your file so that we can best coordinate your care - please contact us with when your test was done so we can update your record.               Please call us at the [facility] location:  [phone] or use [facility] to address the above recommendations.     Thank you for trusting [facility].  We appreciate the opportunity to serve you and look forward to supporting your healthcare in the future.    If you have (or plan to have) any of these tests done at a facility other than a [facility] or a [facility], please have the results sent to the [facility] location noted above.      Best Regards,    DINAH De Leon

## 2019-06-12 ENCOUNTER — TELEPHONE (OUTPATIENT)
Dept: FAMILY MEDICINE | Facility: CLINIC | Age: 59
End: 2019-06-12

## 2019-06-12 NOTE — TELEPHONE ENCOUNTER
Panel Management Review      Patient has the following on her problem list: None      Composite cancer screening  Chart review shows that this patient is due/due soon for the following Mammogram  Summary:    Patient is due/failing the following:   MAMMOGRAM and PHYSICAL    Action needed:   Patient needs office visit for physical. and pt needs to do mammo    Type of outreach:    Sent letter.    Questions for provider review:    None

## 2019-06-12 NOTE — LETTER
June 12, 2019    Cass Vick  [address] [address]    Dear Andreea Ramon cares about your health and your health plan.  I have reviewed your medical conditions, medication list and lab results, and am making recommendations based on this review to better manage your health.    You are in particular need of attention regarding:  -Breast Cancer Screening  -Wellness (Physical) Visit     I am recommending that you:     -schedule a WELLNESS (Physical) APPOINTMENT with me.   I will check fasting labs the same day - nothing to eat except water and meds for 8-10 hours prior. (If you go elsewhere for Wellness visits then please disregard this reminder.)    -schedule a MAMMOGRAM which is due.    1 in 8 women will develop invasive breast cancer during her lifetime and it is the most common non-skin cancer in American women.  EARLY detection, new treatments, and a better understanding of the disease have increased survival rates - the 5 year survival rate in the 1960s was 63% and today it is close to 90%.    If you are under/uninsured, we recommend you contact the [facility]. They offer mammograms at no charge or on a sliding fee charge. You can schedule with them at [phone]. Please have them send us the results.      Please disregard this reminder if you have had this exam elsewhere within the last year.  It would be helpful for us to have a copy of your mammogram report in your file so that we can best coordinate your care - please contact us with when your test was done so we can update your record.               Please call us at the [facility] location:  [phone] or use [employer] to address the above recommendations.     Thank you for trusting [facility].  We appreciate the opportunity to serve you and look forward to supporting your healthcare in the future.    If you have (or plan to have) any of these tests done at a facility other than a [facility] or a [place]  [facility], please have the results sent to the [facility] location noted above.      Best Regards,    DINAH De Leon

## 2019-10-02 ENCOUNTER — HEALTH MAINTENANCE LETTER (OUTPATIENT)
Age: 59
End: 2019-10-02

## 2019-10-08 NOTE — PROGRESS NOTES
SUBJECTIVE:   CC: Cass Vick is an 59 year old woman who presents for preventive health visit.     Healthy Habits:     Getting at least 3 servings of Calcium per day:  Yes    Bi-annual eye exam:  Yes    Dental care twice a year:  Yes    Sleep apnea or symptoms of sleep apnea:  None    Diet:  Gluten-free/reduced    Frequency of exercise:  6-7 days/week    Duration of exercise:  30-45 minutes    Taking medications regularly:  Yes    Medication side effects:  Not applicable    PHQ-2 Total Score: 0    Additional concerns today:  Yes        -would like thyroid check  -SI injection in back 9/9/19, deep ache radiating into her pelvic area now, had PT after back surgery in March.  Whole back hurts through to pelvis.    -has multiple surgeries that need to be added-updated  -tdap and flu shot today    Today's PHQ-2 Score:   PHQ-2 ( 1999 Pf[employer]) 10/9/2019   Q1: Little interest or pleasure in doing things 0   Q2: Feeling down, depressed or hopeless 0   PHQ-2 Score 0   Q1: Little interest or pleasure in doing things Not at all   Q2: Feeling down, depressed or hopeless Not at all   PHQ-2 Score 0       Abuse: Current or Past(Physical, Sexual or Emotional)- No  Do you feel safe in your environment? Yes    Social History     Tobacco Use     Smoking status: Never Smoker     Smokeless tobacco: Never Used   Substance Use Topics     Alcohol use: Yes     Comment: 1 glass every 2 weeks     If you drink alcohol do you typically have >3 drinks per day or >7 drinks per week? No    Alcohol Use 10/9/2019   Prescreen: >3 drinks/day or >7 drinks/week? No   Prescreen: >3 drinks/day or >7 drinks/week? -   No flowsheet data found.    Reviewed orders with patient.  Reviewed health maintenance and updated orders accordingly - Yes  BP Readings from Last 3 Encounters:   10/09/19 114/76   02/28/19 122/74   07/25/17 123/86    Wt Readings from Last 3 Encounters:   10/09/19 55.8 kg (123 lb)   02/28/19 54.9 kg (121 lb)   03/28/17 53.5 kg  "(118 lb)                  Recent Labs   Lab Test 12/05/16  1007 08/23/16  0957 10/03/13  1037   LDL  --  138* 96   HDL  --  51 48*   TRIG  --  154* 131   ALT  --   --  22   CR 0.91  --  0.80   GFRESTIMATED 64  --  75   GFRESTBLACK 77  --  >90   POTASSIUM  --   --  4.0   TSH  --  1.62 1.49        Mammogram Screening: Patient over age 50, mutual decision to screen reflected in health maintenance.    Pertinent mammograms are reviewed under the imaging tab.  History of abnormal Pap smear: Status post benign hysterectomy. Health Maintenance and Surgical History updated.     Reviewed and updated as needed this visit by clinical staff  Tobacco  Allergies  Meds  Problems  Med Hx  Surg Hx  Fam Hx  Soc Hx          Reviewed and updated as needed this visit by Provider  Tobacco  Allergies  Meds  Problems  Med Hx  Surg Hx  Fam Hx  Soc Hx           Review of Systems  CONSTITUTIONAL: NEGATIVE for fever, chills, change in weight  INTEGUMENTARY/SKIN: NEGATIVE for worrisome rashes, moles or lesions  EYES: NEGATIVE for vision changes or irritation  ENT: NEGATIVE for ear, mouth and throat problems  RESP: NEGATIVE for significant cough or SOB  BREAST: NEGATIVE for masses, tenderness or discharge  CV: NEGATIVE for chest pain, palpitations or peripheral edema  GI: POSITIVE for ibs  : NEGATIVE for unusual urinary or vaginal symptoms. No vaginal bleeding.  MUSCULOSKELETAL:arthralgias and muscle cramps  NEURO: NEGATIVE for weakness, dizziness or paresthesias  ENDOCRINE: POSITIVE  for cold intolerance, constipation and skin changes  PSYCHIATRIC: NEGATIVE for changes in mood or affect      OBJECTIVE:   /76   Pulse 94   Temp 98.1  F (36.7  C) (Tympanic)   Resp 14   Ht 1.626 m (5' 4\")   Wt 55.8 kg (123 lb)   SpO2 99%   BMI 21.11 kg/m    Physical Exam  GENERAL APPEARANCE: healthy, alert and no distress  EYES: Eyes grossly normal to inspection, PERRL and conjunctivae and sclerae normal  HENT: ear canals and TM's " normal, nose and mouth without ulcers or lesions, oropharynx clear and oral mucous membranes moist  NECK: no adenopathy, no asymmetry, masses, or scars and thyroid normal to palpation  RESP: lungs clear to auscultation - no rales, rhonchi or wheezes  BREAST: normal without masses, tenderness or nipple discharge and no palpable axillary masses or adenopathy  CV: regular rate and rhythm, normal S1 S2, no S3 or S4, no murmur, click or rub, no peripheral edema and peripheral pulses strong  ABDOMEN: soft, nontender, no hepatosplenomegaly, no masses and bowel sounds normal  MS: no musculoskeletal defects are noted and gait is age appropriate without ataxia  SKIN: no suspicious lesions or rashes  NEURO: Normal strength and tone, sensory exam grossly normal, mentation intact and speech normal  PSYCH: mentation appears normal and affect normal/bright    Diagnostic Test Results:  Results for orders placed or performed in visit on 10/09/19 (from the past 24 hour(s))   CBC with platelets   Result Value Ref Range    WBC 6.1 4.0 - 11.0 10e9/L    RBC Count 4.34 3.8 - 5.2 10e12/L    Hemoglobin 13.6 11.7 - 15.7 g/dL    Hematocrit 40.7 35.0 - 47.0 %    MCV 94 78 - 100 fl    MCH 31.3 26.5 - 33.0 pg    MCHC 33.4 31.5 - 36.5 g/dL    RDW 13.1 10.0 - 15.0 %    Platelet Count 238 150 - 450 10e9/L   CRP inflammation   Result Value Ref Range    CRP Inflammation <2.9 0.0 - 8.0 mg/L       ASSESSMENT/PLAN:       ICD-10-CM    1. Routine general medical examination at a health care facility Z00.00 Lipid panel reflex to direct LDL Fasting     CBC with platelets     Comprehensive metabolic panel     TSH with free T4 reflex     **Hepatitis C Screen Reflex to RNA FUTURE anytime   2. Visit for screening mammogram Z12.31 MA Screening Digital Bilateral   3. Hyperlipidemia LDL goal <160 E78.5    4. Need for vaccination Z23 C RIV4 (FLUBLOK) VACCINE RECOMBINANT DNA PRSRV ANTIBIO FREE, IM [[id number]]     VACCINE ADMINISTRATION, INITIAL     TDAP VACCINE      "VACCINE ADMINISTRATION, EACH ADDITIONAL     ZOSTER VACCINE RECOMBINANT ADJUVANTED IM NJX   5. Muscle cramps R25.2 Magnesium     CRP inflammation     Will check CRP and magnesium for muscle cramps, TSH for family history of thyroid disease.  Hep C screening today.  Vaccines as ordered.    COUNSELING:  Reviewed preventive health counseling, as reflected in patient instructions    Estimated body mass index is 21.11 kg/m  as calculated from the following:    Height as of this encounter: 1.626 m (5' 4\").    Weight as of this encounter: 55.8 kg (123 lb).         reports that she has never smoked. She has never used smokeless tobacco.      Counseling Resources:  ATP IV Guidelines  Pooled Cohorts Equation Calculator  Breast Cancer Risk Calculator  FRAX Risk Assessment  ICSI Preventive Guidelines  Dietary Guidelines for Americans, 2010  USDA's MyPlate  ASA Prophylaxis  Lung CA Screening    Fatoumata Grisosm PA-C  [facility]  "

## 2019-10-09 ENCOUNTER — OFFICE VISIT (OUTPATIENT)
Dept: FAMILY MEDICINE | Facility: CLINIC | Age: 59
End: 2019-10-09
Payer: COMMERCIAL

## 2019-10-09 VITALS
HEIGHT: 64 IN | OXYGEN SATURATION: 99 % | SYSTOLIC BLOOD PRESSURE: 114 MMHG | DIASTOLIC BLOOD PRESSURE: 76 MMHG | BODY MASS INDEX: 21 KG/M2 | WEIGHT: 123 LBS | HEART RATE: 94 BPM | RESPIRATION RATE: 14 BRPM | TEMPERATURE: 98.1 F

## 2019-10-09 DIAGNOSIS — R25.2 MUSCLE CRAMPS: ICD-10-CM

## 2019-10-09 DIAGNOSIS — E78.5 HYPERLIPIDEMIA LDL GOAL <160: ICD-10-CM

## 2019-10-09 DIAGNOSIS — Z12.31 VISIT FOR SCREENING MAMMOGRAM: ICD-10-CM

## 2019-10-09 DIAGNOSIS — Z23 NEED FOR VACCINATION: ICD-10-CM

## 2019-10-09 DIAGNOSIS — Z00.00 ROUTINE GENERAL MEDICAL EXAMINATION AT A HEALTH CARE FACILITY: Primary | ICD-10-CM

## 2019-10-09 LAB
CRP SERPL-MCNC: <2.9 MG/L (ref 0–8)
ERYTHROCYTE [DISTWIDTH] IN BLOOD BY AUTOMATED COUNT: 13.1 % (ref 10–15)
HCT VFR BLD AUTO: 40.7 % (ref 35–47)
HGB BLD-MCNC: 13.6 G/DL (ref 11.7–15.7)
MCH RBC QN AUTO: 31.3 PG (ref 26.5–33)
MCHC RBC AUTO-ENTMCNC: 33.4 G/DL (ref 31.5–36.5)
MCV RBC AUTO: 94 FL (ref 78–100)
PLATELET # BLD AUTO: 238 10E9/L (ref 150–450)
RBC # BLD AUTO: 4.34 10E12/L (ref 3.8–5.2)
WBC # BLD AUTO: 6.1 10E9/L (ref 4–11)

## 2019-10-09 PROCEDURE — 90471 IMMUNIZATION ADMIN: CPT | Performed by: PHYSICIAN ASSISTANT

## 2019-10-09 PROCEDURE — 90750 HZV VACC RECOMBINANT IM: CPT | Performed by: PHYSICIAN ASSISTANT

## 2019-10-09 PROCEDURE — 90472 IMMUNIZATION ADMIN EACH ADD: CPT | Performed by: PHYSICIAN ASSISTANT

## 2019-10-09 PROCEDURE — 80053 COMPREHEN METABOLIC PANEL: CPT | Performed by: PHYSICIAN ASSISTANT

## 2019-10-09 PROCEDURE — 90682 RIV4 VACC RECOMBINANT DNA IM: CPT | Performed by: PHYSICIAN ASSISTANT

## 2019-10-09 PROCEDURE — 83735 ASSAY OF MAGNESIUM: CPT | Performed by: PHYSICIAN ASSISTANT

## 2019-10-09 PROCEDURE — 86803 HEPATITIS C AB TEST: CPT | Performed by: PHYSICIAN ASSISTANT

## 2019-10-09 PROCEDURE — 90715 TDAP VACCINE 7 YRS/> IM: CPT | Performed by: PHYSICIAN ASSISTANT

## 2019-10-09 PROCEDURE — 36415 COLL VENOUS BLD VENIPUNCTURE: CPT | Performed by: PHYSICIAN ASSISTANT

## 2019-10-09 PROCEDURE — 99396 PREV VISIT EST AGE 40-64: CPT | Mod: 25 | Performed by: PHYSICIAN ASSISTANT

## 2019-10-09 PROCEDURE — 80061 LIPID PANEL: CPT | Performed by: PHYSICIAN ASSISTANT

## 2019-10-09 PROCEDURE — 84443 ASSAY THYROID STIM HORMONE: CPT | Performed by: PHYSICIAN ASSISTANT

## 2019-10-09 PROCEDURE — 85027 COMPLETE CBC AUTOMATED: CPT | Performed by: PHYSICIAN ASSISTANT

## 2019-10-09 PROCEDURE — 86140 C-REACTIVE PROTEIN: CPT | Performed by: PHYSICIAN ASSISTANT

## 2019-10-09 ASSESSMENT — MIFFLIN-ST. JEOR: SCORE: 1112.92

## 2019-10-09 NOTE — NURSING NOTE
Prior to immunization administration, verified patients identity using patient s name and date of birth. Please see Immunization Activity for additional information.     Screening Questionnaire for Adult Immunization    Are you sick today?   No   Do you have allergies to medications, food, a vaccine component or latex?   No   Have you ever had a serious reaction after receiving a vaccination?   No   Do you have a long-term health problem with heart disease, lung disease, asthma, kidney disease, metabolic disease (e.g. diabetes), anemia, or other blood disorder?   No   Do you have cancer, leukemia, HIV/AIDS, or any other immune system problem?   No   In the past 3 months, have you taken medications that affect  your immune system, such as prednisone, other steroids, or anticancer drugs; drugs for the treatment of rheumatoid arthritis, Crohn s disease, or psoriasis; or have you had radiation treatments?   No   Have you had a seizure, or a brain or other nervous system problem?   No   During the past year, have you received a transfusion of blood or blood     products, or been given immune (gamma) globulin or antiviral drug?   No   For women: Are you pregnant or is there a chance you could become        pregnant during the next month?   No   Have you received any vaccinations in the past 4 weeks?   No     Immunization questionnaire answers were all negative.        Per orders of DINAH De Leon, injection of tdap, shingrix and influenza given by Faye Nuno. Patient instructed to remain in clinic for 15 minutes afterwards, and to report any adverse reaction to me immediately.       Screening performed by Faye Nuno on 10/9/2019 at 11:45 AM.

## 2019-10-10 LAB
ALBUMIN SERPL-MCNC: 4.3 G/DL (ref 3.4–5)
ALP SERPL-CCNC: 83 U/L (ref 40–150)
ALT SERPL W P-5'-P-CCNC: 25 U/L (ref 0–50)
ANION GAP SERPL CALCULATED.3IONS-SCNC: 11 MMOL/L (ref 3–14)
AST SERPL W P-5'-P-CCNC: 17 U/L (ref 0–45)
BILIRUB SERPL-MCNC: 0.4 MG/DL (ref 0.2–1.3)
BUN SERPL-MCNC: 9 MG/DL (ref 7–30)
CALCIUM SERPL-MCNC: 9 MG/DL (ref 8.5–10.1)
CHLORIDE SERPL-SCNC: 104 MMOL/L (ref 94–109)
CHOLEST SERPL-MCNC: 271 MG/DL
CO2 SERPL-SCNC: 23 MMOL/L (ref 20–32)
CREAT SERPL-MCNC: 0.68 MG/DL (ref 0.52–1.04)
GFR SERPL CREATININE-BSD FRML MDRD: >90 ML/MIN/{1.73_M2}
GLUCOSE SERPL-MCNC: 88 MG/DL (ref 70–99)
HCV AB SERPL QL IA: NONREACTIVE
HDLC SERPL-MCNC: 47 MG/DL
LDLC SERPL CALC-MCNC: 177 MG/DL
MAGNESIUM SERPL-MCNC: 2.4 MG/DL (ref 1.6–2.3)
NONHDLC SERPL-MCNC: 224 MG/DL
POTASSIUM SERPL-SCNC: 3.9 MMOL/L (ref 3.4–5.3)
PROT SERPL-MCNC: 7.2 G/DL (ref 6.8–8.8)
SODIUM SERPL-SCNC: 138 MMOL/L (ref 133–144)
TRIGL SERPL-MCNC: 237 MG/DL
TSH SERPL DL<=0.005 MIU/L-ACNC: 1.33 MU/L (ref 0.4–4)

## 2019-10-10 NOTE — RESULT ENCOUNTER NOTE
Kan Ramon,    I just wanted to let you know that your lab results have been reviewed and are attached.    - Your lab results look great; everything is normal.  - Your cholesterol is high but the American Heart Association does not recommend starting a medication to lower this at this time.  We will recheck next year.  - Your metabolic panel shows:  normal electrolytes (sodium, potassium, calcium), kidney function (creatinine and GFR),  blood sugar and liver function (AST/ALT).  - Your TSH, a screening test for thyroid disease, was normal.  - Your Blood Count Results show normal White Blood Cell count (no sign of infection), normal Hemoglobin (not anemia), and normal Platelets (affects clotting).  - Your Hepatitis C Screening test was negative for infection.  - Your CRP is normal (marker of inflammation/infection) so that is not the cause of your muscle aches.  - Your magnesium is within normal limits.    Please let me know if you have any questions and have a great week!    Sincerely,    Monica Grissom PA-C    [facility]  [address]  [place] [address]  [phone] (p)

## 2019-12-01 ENCOUNTER — E-VISIT (OUTPATIENT)
Dept: FAMILY MEDICINE | Facility: CLINIC | Age: 59
End: 2019-12-01
Payer: COMMERCIAL

## 2019-12-01 DIAGNOSIS — H57.89 RED EYE: Primary | ICD-10-CM

## 2019-12-01 DIAGNOSIS — H44.003 EYE INFECTION, BILATERAL: Primary | ICD-10-CM

## 2019-12-01 PROCEDURE — 99444 ZZC PHYSICIAN ONLINE EVALUATION & MANAGEMENT SERVICE: CPT | Performed by: FAMILY MEDICINE

## 2019-12-01 RX ORDER — TOBRAMYCIN 3 MG/ML
SOLUTION/ DROPS OPHTHALMIC
Qty: 1 BOTTLE | Refills: 0 | Status: SHIPPED | OUTPATIENT
Start: 2019-12-01 | End: 2019-12-09

## 2019-12-06 RX ORDER — DIAZEPAM 2 MG
TABLET ORAL
COMMUNITY
Start: 2019-03-04 | End: 2020-02-03

## 2019-12-06 NOTE — PROGRESS NOTES
Subjective     Cass Vick is a 59 year old female who presents to clinic today for the following health issues:    HPI   Constipation     Onset: ongoing issue, hx of IBS    Description:   Frequency of bowel movements: 24 hours.  Stool consistency: soft formed    Progression of Symptoms:  worsening    Accompanying Signs & Symptoms:  Abdominal pain (cramping?): YES  Blood in stool: YES  Rectal pain: no   Nausea/vomiting: no   Weight loss or gain: no    History:   History of abdominal surgery: no     Precipitating factors:   Recent use of narcotics, anticholinergics, calcium channel blockers, antacids, or iron supplements: no   Chronic Laxative Use: no          Therapies Tried and outcome: miralax 2 x week, magnesium    Recent abdominal pain     Depression Followup    How are you doing with your depression since your last visit? Worsened since her mother  and she had to put 2 dogs down.  Also she suffers from seasonal affective disorder and so that has been affecting her of late also.    Are you having other symptoms that might be associated with depression? Yes:  Lots of physical complaints    Have you had a significant life event?  Yes see above     Are you feeling anxious or having panic attacks?   Yes:  Just restarted    Do you have any concerns with your use of alcohol or other drugs? No    Social History     Tobacco Use     Smoking status: Never Smoker     Smokeless tobacco: Never Used   Substance Use Topics     Alcohol use: Yes     Comment: 1 glass every 2 weeks     Drug use: No     PHQ 2016   PHQ-9 Total Score 2   Q9: Thoughts of better off dead/self-harm past 2 weeks Not at all     No flowsheet data found.        Suicide Assessment Five-step Evaluation and Treatment (SAFE-T)      Patient Active Problem List   Diagnosis     Irritable bowel syndrome     Seasonal allergic rhinitis     Hyperlipidemia LDL goal <160     Other spondylosis with radiculopathy, lumbar region     Depression with  anxiety     Seasonal affective disorder (H)     Anxiety attack     Past Surgical History:   Procedure Laterality Date     ------------OTHER-------------  12/2016    Lumbar Spine L4-S1     ------------OTHER-------------  03/2019    L4-S1 disc bulge repair     ------------OTHER-------------  06/2019    mouth surgery- cyst in the jaw, replaced with donor bone     BIOPSY BREAST      Needle biopsy before lump removal.     BUNIONECTOMY       ENDOSCOPIC STRIPPING VEIN(S)      Left leg     EXTRACORPOREAL SHOCK WAVE LITHOTRIPSY (ESWL)       EXTRACORPOREAL SHOCK WAVE LITHOTRIPSY, CYSTOSCOPY, INSERT STENT URETER(S), COMBINED       GENITOURINARY SURGERY       HYSTERECTOMY, PAP NO LONGER INDICATED       LIGATE VEIN VARICOSE, PHLEBECTOMY MULTIPLE STAB, COMBINED  1/26/2012    Procedure:COMBINED LIGATE VEIN VARICOSE, PHLEBECTOMY MULTIPLE STAB; LEFT LEG LIGATION, DIVISION, AND STRIPPING OF GREATER SAPHENOUS VEIN (KNEE TO ANKLE), MULTIPLE STAB PHLEBECTOMIES (1 UNIT OF PHLEBECTOMY); Surgeon:NICKO RODRIGUEZ; Location:[facility]     LUMPECTOMY BREAST      Right.     ORTHOPEDIC SURGERY      hip, carpal     OVARY SURGERY       PUNC/ASPIR BREAST CYST         Social History     Tobacco Use     Smoking status: Never Smoker     Smokeless tobacco: Never Used   Substance Use Topics     Alcohol use: Yes     Comment: 1 glass every 2 weeks     Family History   Problem Relation Age of Onset     Genitourinary Problems Mother         kidney failure on dialysis     Hypertension Mother      Hyperlipidemia Mother      Osteoporosis Mother      Thyroid Disease Mother      Eye Disorder Father         poor vision     Hypertension Father      Hyperlipidemia Father      Cerebrovascular Disease Father      Hypertension Brother      Substance Abuse Brother      Thyroid Disease Sister      Depression Sister      Anxiety Disorder Sister      Thyroid Disease Maternal Grandmother      Coronary Artery Disease Maternal Grandfather      Breast Cancer Paternal  Grandmother              Reviewed and updated as needed this visit by Provider         Review of Systems   ROS COMP: Constitutional, HEENT, cardiovascular, pulmonary, gi and gu systems are negative, except as otherwise noted.  She is now having pain in her right hip.  She has had her left hip done twice with a revision done at [facility].  She has also had a bone graft to the front of her mandible because she had lost the bone in between a couple of her teeth in the front.      Objective    /74   Pulse 92   Temp 97.7  F (36.5  C) (Tympanic)   Resp 16   Wt 54 kg (119 lb)   SpO2 100%   BMI 20.43 kg/m    Body mass index is 20.43 kg/m .  Physical Exam   GENERAL APPEARANCE: healthy, alert and no distress  ABDOMEN: soft, nontender, without hepatosplenomegaly or masses and bowel sounds normal  PSYCH: mentation appears normal, affect normal/bright and she did get somewhat emotional when she started talking about losing her mother and putting 2 dogs down.    Diagnostic Test Results:  Labs reviewed in Epic        Assessment & Plan       ICD-10-CM    1. Irritable bowel syndrome with constipation K58.1 T3 total     TSH     T4 FREE     escitalopram (LEXAPRO) 5 MG tablet   2. Depression with anxiety F41.8    3. Seasonal affective disorder (H) F33.8    4. Anxiety attack F41.0           Patient Instructions   She did not do well trying just fiber to control her irritable bowel syndrome.  I did recommend trying the fiber plus the MiraLAX.  She will continue on her gluten-free and largely lactose-free diet.  She will follow-up in 1 month.    I placed her on Lexapro 5 mg daily.    She is complaining of dry skin, feeling cold and has a very positive family history for thyroid issues.  Her last TSH level was completely normal.  She is somewhat convinced that getting on thyroid medication would somehow also make her irritable bowel symptoms better.  I am not aware of any connection like that especially in someone who is  euthyroid.  I did we will check her full thyroid panel today.      Return in about 4 weeks (around 1/6/2020) for anxiety, depression, IBS.    Erickson Mcmahon MD  [facility]

## 2019-12-09 ENCOUNTER — OFFICE VISIT (OUTPATIENT)
Dept: FAMILY MEDICINE | Facility: CLINIC | Age: 59
End: 2019-12-09
Payer: COMMERCIAL

## 2019-12-09 VITALS
DIASTOLIC BLOOD PRESSURE: 74 MMHG | WEIGHT: 119 LBS | HEART RATE: 92 BPM | OXYGEN SATURATION: 100 % | RESPIRATION RATE: 16 BRPM | SYSTOLIC BLOOD PRESSURE: 112 MMHG | BODY MASS INDEX: 20.43 KG/M2 | TEMPERATURE: 97.7 F

## 2019-12-09 DIAGNOSIS — F41.0 ANXIETY ATTACK: ICD-10-CM

## 2019-12-09 DIAGNOSIS — K58.1 IRRITABLE BOWEL SYNDROME WITH CONSTIPATION: Primary | ICD-10-CM

## 2019-12-09 DIAGNOSIS — F33.8 SEASONAL AFFECTIVE DISORDER (H): ICD-10-CM

## 2019-12-09 DIAGNOSIS — F41.8 DEPRESSION WITH ANXIETY: ICD-10-CM

## 2019-12-09 LAB — T3 SERPL-MCNC: 100 NG/DL (ref 60–181)

## 2019-12-09 PROCEDURE — 84443 ASSAY THYROID STIM HORMONE: CPT | Performed by: FAMILY MEDICINE

## 2019-12-09 PROCEDURE — 99214 OFFICE O/P EST MOD 30 MIN: CPT | Performed by: FAMILY MEDICINE

## 2019-12-09 PROCEDURE — 84439 ASSAY OF FREE THYROXINE: CPT | Performed by: FAMILY MEDICINE

## 2019-12-09 PROCEDURE — 36415 COLL VENOUS BLD VENIPUNCTURE: CPT | Performed by: FAMILY MEDICINE

## 2019-12-09 PROCEDURE — 84480 ASSAY TRIIODOTHYRONINE (T3): CPT | Performed by: FAMILY MEDICINE

## 2019-12-09 RX ORDER — ESCITALOPRAM OXALATE 5 MG/1
5 TABLET ORAL DAILY
Qty: 30 TABLET | Refills: 1 | Status: SHIPPED | OUTPATIENT
Start: 2019-12-09 | End: 2020-02-03

## 2019-12-09 NOTE — PATIENT INSTRUCTIONS
She did not do well trying just fiber to control her irritable bowel syndrome.  I did recommend trying the fiber plus the MiraLAX.  She will continue on her gluten-free and largely lactose-free diet.  She will follow-up in 1 month.    I placed her on Lexapro 5 mg daily.    She is complaining of dry skin, feeling cold and has a very positive family history for thyroid issues.  Her last TSH level was completely normal.  She is somewhat convinced that getting on thyroid medication would somehow also make her irritable bowel symptoms better.  I am not aware of any connection like that especially in someone who is euthyroid.  I did we will check her full thyroid panel today.

## 2019-12-10 LAB
T4 FREE SERPL-MCNC: 1.08 NG/DL (ref 0.76–1.46)
TSH SERPL DL<=0.005 MIU/L-ACNC: 1.13 MU/L (ref 0.4–4)

## 2019-12-12 ENCOUNTER — E-VISIT (OUTPATIENT)
Dept: FAMILY MEDICINE | Facility: CLINIC | Age: 59
End: 2019-12-12
Payer: COMMERCIAL

## 2019-12-12 ENCOUNTER — TELEPHONE (OUTPATIENT)
Dept: FAMILY MEDICINE | Facility: CLINIC | Age: 59
End: 2019-12-12

## 2019-12-12 ENCOUNTER — MYC MEDICAL ADVICE (OUTPATIENT)
Dept: FAMILY MEDICINE | Facility: CLINIC | Age: 59
End: 2019-12-12

## 2019-12-12 DIAGNOSIS — H00.14 CHALAZION OF LEFT UPPER EYELID: Primary | ICD-10-CM

## 2019-12-12 DIAGNOSIS — H44.003 EYE INFECTION, BILATERAL: ICD-10-CM

## 2019-12-12 PROCEDURE — 99444 ZZC PHYSICIAN ONLINE EVALUATION & MANAGEMENT SERVICE: CPT | Performed by: PHYSICIAN ASSISTANT

## 2019-12-12 RX ORDER — CEPHALEXIN 500 MG/1
500 CAPSULE ORAL 2 TIMES DAILY
Qty: 5 CAPSULE | Refills: 0 | Status: SHIPPED | OUTPATIENT
Start: 2019-12-12 | End: 2020-02-03

## 2019-12-12 RX ORDER — TOBRAMYCIN 3 MG/ML
SOLUTION/ DROPS OPHTHALMIC
Qty: 1 BOTTLE | Refills: 0 | Status: SHIPPED | OUTPATIENT
Start: 2019-12-12 | End: 2020-02-03

## 2019-12-12 NOTE — TELEPHONE ENCOUNTER
Per E-visit relating to the issue pt is having today: I will send a prescription to your pharmacy for eye drops. Don't go back to your contacts until a day after everything is back to normal. If the drops don't work, we should see you.    Today:  The red bump has gotten worse. In my inner lid under lash line on my left eye. Back to wearing glasses and started eye drops again and warm compress. Any other suggestions?      Switched to telephone encounter.     Sent reply to Lema21 message.

## 2019-12-12 NOTE — TELEPHONE ENCOUNTER
Patient Contact    Attempt # 1    Was call answered?  No.  Left message on voicemail with information to call me back.    Per E-visit relating to the issue pt is having today: I will send a prescription to your pharmacy for eye drops. Don't go back to your contacts until a day after everything is back to normal. If the drops don't work, we should see you.    Today:  The red bump has gotten worse. In my inner lid under lash line on my left eye. Back to wearing glasses and started eye drops again and warm compress. Any other suggestions?      Sent [employer] message and asked pt to call triage or make appt for same day.

## 2019-12-12 NOTE — TELEPHONE ENCOUNTER
FYI: route to provider with sx- can pt just get another script?    Was seen on 12/01 for eye issue.     Was seen on 12/09 with Dr. Mcmahon. Eye was OK during this time. States she was on multiple abx during this time.     Pt was prescribed Tobramycin 0.3% eye drops and these, or her other abx, appear to have made eye significantly better. States that during this time of taking medication, she thinks it may be the mix of meds, she got a rash.    Today sx are EXACTLY the same as when she was last seen. She is not wearing her contacts at this time. She also says she can not come in, because she is not comfortable driving with her glasses.      Of note, leaving to [place] and would like meds before then if it makes sense.

## 2019-12-12 NOTE — TELEPHONE ENCOUNTER
Call back from patient.      Provider message was shared with patient.     States that she will start an evisit for this concern and send a picture.

## 2020-02-03 ENCOUNTER — OFFICE VISIT (OUTPATIENT)
Dept: FAMILY MEDICINE | Facility: CLINIC | Age: 60
End: 2020-02-03
Payer: COMMERCIAL

## 2020-02-03 VITALS
TEMPERATURE: 97.1 F | DIASTOLIC BLOOD PRESSURE: 70 MMHG | SYSTOLIC BLOOD PRESSURE: 116 MMHG | OXYGEN SATURATION: 97 % | HEART RATE: 87 BPM | RESPIRATION RATE: 14 BRPM

## 2020-02-03 DIAGNOSIS — E83.41 HIGH MAGNESIUM LEVELS: ICD-10-CM

## 2020-02-03 DIAGNOSIS — E78.5 HYPERLIPIDEMIA LDL GOAL <160: ICD-10-CM

## 2020-02-03 DIAGNOSIS — F41.8 DEPRESSION WITH ANXIETY: Primary | ICD-10-CM

## 2020-02-03 LAB
CHOLEST SERPL-MCNC: 221 MG/DL
HDLC SERPL-MCNC: 42 MG/DL
LDLC SERPL CALC-MCNC: 137 MG/DL
MAGNESIUM SERPL-MCNC: 2.2 MG/DL (ref 1.6–2.3)
NONHDLC SERPL-MCNC: 179 MG/DL
TRIGL SERPL-MCNC: 210 MG/DL

## 2020-02-03 PROCEDURE — 83735 ASSAY OF MAGNESIUM: CPT | Performed by: FAMILY MEDICINE

## 2020-02-03 PROCEDURE — 36415 COLL VENOUS BLD VENIPUNCTURE: CPT | Performed by: FAMILY MEDICINE

## 2020-02-03 PROCEDURE — 99214 OFFICE O/P EST MOD 30 MIN: CPT | Performed by: FAMILY MEDICINE

## 2020-02-03 PROCEDURE — 80061 LIPID PANEL: CPT | Performed by: FAMILY MEDICINE

## 2020-02-03 RX ORDER — ESCITALOPRAM OXALATE 5 MG/1
5 TABLET ORAL DAILY
Qty: 90 TABLET | Refills: 1 | Status: SHIPPED | OUTPATIENT
Start: 2020-02-03 | End: 2020-07-27

## 2020-02-03 NOTE — PROGRESS NOTES
Subjective     Cass Vick is a 59 year old female who presents to clinic today for the following health issues:    HPI   Depression and Anxiety Follow-Up    How are you doing with your depression since your last visit? Improved     How are you doing with your anxiety since your last visit?  Improved     Are you having other symptoms that might be associated with depression or anxiety? No    Have you had a significant life event? No     Do you have any concerns with your use of alcohol or other drugs? No    Social History     Tobacco Use     Smoking status: Never Smoker     Smokeless tobacco: Never Used   Substance Use Topics     Alcohol use: Yes     Comment: 1 glass every 2 weeks     Drug use: No     PHQ 1/11/2016   PHQ-9 Total Score 2   Q9: Thoughts of better off dead/self-harm past 2 weeks Not at all     No flowsheet data found.  Last PHQ-9 1/11/2016   1.  Little interest or pleasure in doing things 0   2.  Feeling down, depressed, or hopeless 1   3.  Trouble falling or staying asleep, or sleeping too much 1   4.  Feeling tired or having little energy 0   5.  Poor appetite or overeating 0   6.  Feeling bad about yourself 0   7.  Trouble concentrating 0   8.  Moving slowly or restless 0   Q9: Thoughts of better off dead/self-harm past 2 weeks 0   PHQ-9 Total Score 2   Difficulty at work, home, or with people Not difficult at all     No flowsheet data found.      Suicide Assessment Five-step Evaluation and Treatment (SAFE-T)      How many servings of fruits and vegetables do you eat daily?  2-3    On average, how many sweetened beverages do you drink each day (Examples: soda, juice, sweet tea, etc.  Do NOT count diet or artificially sweetened beverages)?   0    How many days per week do you exercise enough to make your heart beat faster? 5    How many minutes a day do you exercise enough to make your heart beat faster? 60 or more    How many days per week do you miss taking your medication?  0    Hyperlipidemia Follow-Up      Are you regularly taking any medication or supplement to lower your cholesterol?   No    Are you having muscle aches or other side effects that you think could be caused by your cholesterol lowering medication?  No      Patient Active Problem List   Diagnosis     Irritable bowel syndrome     Seasonal allergic rhinitis     Hyperlipidemia LDL goal <160     Other spondylosis with radiculopathy, lumbar region     Depression with anxiety     Seasonal affective disorder (H)     Anxiety attack     High magnesium levels     Past Surgical History:   Procedure Laterality Date     ------------OTHER-------------  12/2016    Lumbar Spine L4-S1     ------------OTHER-------------  03/2019    L4-S1 disc bulge repair     ------------OTHER-------------  06/2019    mouth surgery- cyst in the jaw, replaced with donor bone     BIOPSY BREAST      Needle biopsy before lump removal.     BUNIONECTOMY       ENDOSCOPIC STRIPPING VEIN(S)      Left leg     EXTRACORPOREAL SHOCK WAVE LITHOTRIPSY (ESWL)       EXTRACORPOREAL SHOCK WAVE LITHOTRIPSY, CYSTOSCOPY, INSERT STENT URETER(S), COMBINED       GENITOURINARY SURGERY       HYSTERECTOMY, PAP NO LONGER INDICATED       LIGATE VEIN VARICOSE, PHLEBECTOMY MULTIPLE STAB, COMBINED  1/26/2012    Procedure:COMBINED LIGATE VEIN VARICOSE, PHLEBECTOMY MULTIPLE STAB; LEFT LEG LIGATION, DIVISION, AND STRIPPING OF GREATER SAPHENOUS VEIN (KNEE TO ANKLE), MULTIPLE STAB PHLEBECTOMIES (1 UNIT OF PHLEBECTOMY); Surgeon:NICKO RODRIGUEZ; Location:[facility]     LUMPECTOMY BREAST      Right.     ORTHOPEDIC SURGERY      hip, carpal     OVARY SURGERY       PUNC/ASPIR BREAST CYST         Social History     Tobacco Use     Smoking status: Never Smoker     Smokeless tobacco: Never Used   Substance Use Topics     Alcohol use: Yes     Comment: 1 glass every 2 weeks     Family History   Problem Relation Age of Onset     Genitourinary Problems Mother         kidney failure on dialysis      Hypertension Mother      Hyperlipidemia Mother      Osteoporosis Mother      Thyroid Disease Mother      Eye Disorder Father         poor vision     Hypertension Father      Hyperlipidemia Father      Cerebrovascular Disease Father      Hypertension Brother      Substance Abuse Brother      Thyroid Disease Sister      Depression Sister      Anxiety Disorder Sister      Thyroid Disease Maternal Grandmother      Coronary Artery Disease Maternal Grandfather      Breast Cancer Paternal Grandmother              Reviewed and updated as needed this visit by Provider         Review of Systems   ROS COMP: Constitutional, HEENT, cardiovascular, pulmonary, gi and gu systems are negative, except as otherwise noted.      Objective    /70 (Patient Position: Sitting, Cuff Size: Adult Regular)   Pulse 87   Temp 97.1  F (36.2  C) (Tympanic)   Resp 14   SpO2 97%   Breastfeeding No   There is no height or weight on file to calculate BMI.  Physical Exam   GENERAL APPEARANCE: healthy, alert and no distress            Assessment & Plan       ICD-10-CM    1. Depression with anxiety F41.8    2. Hyperlipidemia LDL goal <160 E78.5 Lipid Profile   3. High magnesium levels E83.41 Magnesium          Patient Instructions   I refilled the patient's Lexapro.  We did do.lipid panel and her magnesium level which was slightly elevated last time she was in the office.  Her last mammogram was in 2016 and she knows that she is long overdue to get that scheduled.  We will set up her follow-up after reviewing her testing.  She is also due towards the end of the year for her annual preventive visit and colonoscopy.        Return in about 3 months (around 5/3/2020) for dyslipidemia.    Erickson Mcmahon MD  [facility]

## 2020-02-03 NOTE — PATIENT INSTRUCTIONS
I refilled the patient's Lexapro.  We did do.lipid panel and her magnesium level which was slightly elevated last time she was in the office.  Her last mammogram was in 2016 and she knows that she is long overdue to get that scheduled.  We will set up her follow-up after reviewing her testing.  She is also due towards the end of the year for her annual preventive visit and colonoscopy.

## 2020-07-24 DIAGNOSIS — F41.8 DEPRESSION WITH ANXIETY: Primary | ICD-10-CM

## 2020-07-27 RX ORDER — ESCITALOPRAM OXALATE 5 MG/1
TABLET ORAL
Qty: 90 TABLET | Refills: 1 | Status: SHIPPED | OUTPATIENT
Start: 2020-07-27 | End: 2021-01-20

## 2020-09-11 ENCOUNTER — OFFICE VISIT (OUTPATIENT)
Dept: FAMILY MEDICINE | Facility: CLINIC | Age: 60
End: 2020-09-11
Payer: COMMERCIAL

## 2020-09-11 VITALS
HEIGHT: 64 IN | OXYGEN SATURATION: 99 % | TEMPERATURE: 98.1 F | SYSTOLIC BLOOD PRESSURE: 106 MMHG | HEART RATE: 89 BPM | WEIGHT: 115 LBS | RESPIRATION RATE: 12 BRPM | BODY MASS INDEX: 19.63 KG/M2 | DIASTOLIC BLOOD PRESSURE: 70 MMHG

## 2020-09-11 DIAGNOSIS — M67.40 GANGLION CYST: ICD-10-CM

## 2020-09-11 DIAGNOSIS — Z23 NEED FOR VACCINATION: ICD-10-CM

## 2020-09-11 DIAGNOSIS — Z01.818 PREOP GENERAL PHYSICAL EXAM: Primary | ICD-10-CM

## 2020-09-11 DIAGNOSIS — Z23 NEED FOR PROPHYLACTIC VACCINATION AND INOCULATION AGAINST INFLUENZA: ICD-10-CM

## 2020-09-11 LAB — HGB BLD-MCNC: 13.4 G/DL (ref 11.7–15.7)

## 2020-09-11 PROCEDURE — 85018 HEMOGLOBIN: CPT | Performed by: PHYSICIAN ASSISTANT

## 2020-09-11 PROCEDURE — 90471 IMMUNIZATION ADMIN: CPT | Performed by: PHYSICIAN ASSISTANT

## 2020-09-11 PROCEDURE — 90472 IMMUNIZATION ADMIN EACH ADD: CPT | Performed by: PHYSICIAN ASSISTANT

## 2020-09-11 PROCEDURE — 90686 IIV4 VACC NO PRSV 0.5 ML IM: CPT | Performed by: PHYSICIAN ASSISTANT

## 2020-09-11 PROCEDURE — 90750 HZV VACC RECOMBINANT IM: CPT | Performed by: PHYSICIAN ASSISTANT

## 2020-09-11 PROCEDURE — 36415 COLL VENOUS BLD VENIPUNCTURE: CPT | Performed by: PHYSICIAN ASSISTANT

## 2020-09-11 PROCEDURE — 99214 OFFICE O/P EST MOD 30 MIN: CPT | Mod: 25 | Performed by: PHYSICIAN ASSISTANT

## 2020-09-11 ASSESSMENT — MIFFLIN-ST. JEOR: SCORE: 1071.64

## 2020-09-11 NOTE — PATIENT INSTRUCTIONS

## 2020-09-11 NOTE — NURSING NOTE
Prior to immunization administration, verified patients identity using patient s name and date of birth. Please see Immunization Activity for additional information.     Screening Questionnaire for Adult Immunization    Are you sick today?   No   Do you have allergies to medications, food, a vaccine component or latex?   No   Have you ever had a serious reaction after receiving a vaccination?   No   Do you have a long-term health problem with heart, lung, kidney, or metabolic disease (e.g., diabetes), asthma, a blood disorder, no spleen, complement component deficiency, a cochlear implant, or a spinal fluid leak?  Are you on long-term aspirin therapy?   No   Do you have cancer, leukemia, HIV/AIDS, or any other immune system problem?   No   Do you have a parent, brother, or sister with an immune system problem?   No   In the past 3 months, have you taken medications that affect  your immune system, such as prednisone, other steroids, or anticancer drugs; drugs for the treatment of rheumatoid arthritis, Crohn s disease, or psoriasis; or have you had radiation treatments?   No   Have you had a seizure, or a brain or other nervous system problem?   No   During the past year, have you received a transfusion of blood or blood    products, or been given immune (gamma) globulin or antiviral drug?   No   For women: Are you pregnant or is there a chance you could become       pregnant during the next month?   No   Have you received any vaccinations in the past 4 weeks?   No     Immunization questionnaire answers were all negative.        Per orders of DINAH De Leon, injection of shingrix given by Faye Nuno. Patient instructed to remain in clinic for 15 minutes afterwards, and to report any adverse reaction to me immediately.       Screening performed by Faye Nuno on 9/11/2020 at 9:58 AM.

## 2020-09-11 NOTE — PROGRESS NOTES
[facility]  [address]  [place] [address]  Phone: [phone]  Fax: [phone]  Primary Provider: Erickson Mcmahon  Pre-op Performing Provider: LUCIA VELA    PREOPERATIVE EVALUATION:  Today's date: 9/11/2020    Cass Vick is a 60 year old female who presents for a preoperative evaluation.    Surgical Information:  Surgery Details 9/11/2020   Surgery/Procedure: cyst left foot 2nd toe   Surgery Location: [facility]   Surgeon: Dr Crowder   Surgery Date: 9/16/20   Time of Surgery: 7:00 am   Where patient plans to recover: At home with family   Additional recovery plan details: N/A     Fax number for surgical facility: [phone]  Type of Anesthesia Anticipated: Local with MAC    Subjective     HPI related to upcoming procedure: Cyst on foot requiring removal.  Preop Questions 9/11/2020   1. Have you ever had a heart attack or stroke? No   2. Have you ever had surgery on your heart or blood vessels, such as a stent placement, a coronary artery bypass, or surgery on an artery in your head, neck, heart, or legs? No   3. Do you have chest pain with activity? No   4. Do you have a history of  heart failure? No   5. Do you currently have a cold, bronchitis or symptoms of other infection? No   6. Do you have a cough, shortness of breath, or wheezing? No   7. Do you or anyone in your family have previous history of blood clots? No   8. Do you or does anyone in your family have a serious bleeding problem such as prolonged bleeding following surgeries or cuts? No   9. Have you ever had problems with anemia or been told to take iron pills? No   10. Have you had any abnormal blood loss such as black, tarry or bloody stools, or abnormal vaginal bleeding? No   11. Have you ever had a blood transfusion? YES - Feb 2018 with hip revision   11a. Have you ever had a transfusion reaction? No   Are you willing to have a blood transfusion if it is  medically needed before, during, or after your surgery? Yes   13. Have you or any of your relatives ever had problems with anesthesia? YES - only with general, gets nausea    14. Do you have sleep apnea, excessive snoring or daytime drowsiness? No   15. Do you have any artifical heart valves or other implanted medical devices like a pacemaker, defibrillator, or continuous glucose monitor? No   16. Do you have artificial joints? YES - hip, right and disc in neck   17. Are you allergic to latex? No   18. Is there any chance that you may be pregnant? No     Patient does not have a Health Care Directive or Living Will: Discussed advance care planning with patient; however, patient declined at this time.    [place] reviewed:  reviewed- no concerns    See problem list for active medical problems.  Problems all longstanding and stable, except as noted/documented.  See ROS for pertinent symptoms related to these conditions.      Review of Systems  Constitutional, neuro, ENT, endocrine, pulmonary, cardiac, gastrointestinal, genitourinary, musculoskeletal, integument and psychiatric systems are negative, except as otherwise noted.    Patient Active Problem List    Diagnosis Date Noted     High magnesium levels 02/03/2020     Priority: Medium     Depression with anxiety 12/09/2019     Priority: Medium     Seasonal affective disorder (H) 12/09/2019     Priority: Medium     Anxiety attack 12/09/2019     Priority: Medium     Other spondylosis with radiculopathy, lumbar region 12/14/2016     Priority: Medium     Hyperlipidemia LDL goal <160 10/03/2013     Priority: Medium     The 10-year ASCVD risk score (Zhen WALTERS Jr., et al., 2013) is: 2.4%    Values used to calculate the score:      Age: 56 years      Sex: Female      Is an : No      Diabetic: No      Tobacco smoker: No      Systolic Blood Pressure: 118 mmHg      Prescribed Antihypertensives: No      HDL Cholesterol: 51 mg/dL      Total  Cholesterol: 220 mg/dL     Seasonal allergic rhinitis      Priority: Medium     Irritable bowel syndrome 01/01/2010     Priority: Medium     Gluten and lactose free (except for small amount of greek yogurt)      Past Medical History:   Diagnosis Date     Allergic rhinitis      Anxiety      Breast mass     Right.     Cyst of breast     Left     Depression      Dyspareunia      Fibromyositis      Irritable bowel      Panic disorder with agoraphobia      PONV (postoperative nausea and vomiting)      Seasonal allergies      Past Surgical History:   Procedure Laterality Date     ------------OTHER-------------  12/2016    Lumbar Spine L4-S1     ------------OTHER-------------  03/2019    L4-S1 disc bulge repair     ------------OTHER-------------  06/2019    mouth surgery- cyst in the jaw, replaced with donor bone     BIOPSY BREAST      Needle biopsy before lump removal.     BUNIONECTOMY       ENDOSCOPIC STRIPPING VEIN(S)      Left leg     EXTRACORPOREAL SHOCK WAVE LITHOTRIPSY (ESWL)       EXTRACORPOREAL SHOCK WAVE LITHOTRIPSY, CYSTOSCOPY, INSERT STENT URETER(S), COMBINED       GENITOURINARY SURGERY       HYSTERECTOMY, PAP NO LONGER INDICATED       LIGATE VEIN VARICOSE, PHLEBECTOMY MULTIPLE STAB, COMBINED  1/26/2012    Procedure:COMBINED LIGATE VEIN VARICOSE, PHLEBECTOMY MULTIPLE STAB; LEFT LEG LIGATION, DIVISION, AND STRIPPING OF GREATER SAPHENOUS VEIN (KNEE TO ANKLE), MULTIPLE STAB PHLEBECTOMIES (1 UNIT OF PHLEBECTOMY); Surgeon:NICKO RODRIGUEZ; Location:[facility]     LUMPECTOMY BREAST      Right.     ORTHOPEDIC SURGERY      hip, carpal     OVARY SURGERY       PUNC/ASPIR BREAST CYST       Current Outpatient Medications   Medication Sig Dispense Refill     Acetaminophen (TYLENOL ARTHRITIS EXT RELIEF OR) Take 500 mg by mouth daily       Calcium-Magnesium 100-50 MG TABS Take 1 tablet by mouth daily       cetirizine (ZYRTEC) 10 MG tablet Take 10 mg by mouth daily.       Cholecalciferol (VITAMIN D) 2000 UNITS CAPS Take 2  "capsules by mouth daily       escitalopram (LEXAPRO) 5 MG tablet TAKE 1 TABLET(5 MG) BY MOUTH DAILY 90 tablet 1     fluticasone (FLONASE) 50 MCG/ACT nasal spray Spray 2 sprays into both nostrils daily 1 Package 11     Polyethylene Glycol 3350 (MIRALAX PO) Take by mouth as needed         Allergies   Allergen Reactions     Doxycycline GI Disturbance     Erythromycin GI Disturbance     Vicodin [Hydrocodone-Acetaminophen]      Heart races     Amoxicillin      Facial swelling, unsure if cause.  Other PNC okay      Social History     Tobacco Use     Smoking status: Never Smoker     Smokeless tobacco: Never Used   Substance Use Topics     Alcohol use: Yes     Comment: 1 glass every 2 weeks     History   Drug Use No            Objective   /70   Pulse 89   Temp 98.1  F (36.7  C) (Tympanic)   Resp 12   Ht 1.626 m (5' 4\")   Wt 52.2 kg (115 lb)   SpO2 99%   BMI 19.74 kg/m    Physical Exam    GENERAL APPEARANCE: healthy, alert and no distress     EYES: EOMI, PERRL     HENT: ear canals and TM's normal and nose and mouth without ulcers or lesions     NECK: no adenopathy, no asymmetry, masses, or scars and thyroid normal to palpation     RESP: lungs clear to auscultation - no rales, rhonchi or wheezes     CV: regular rates and rhythm, normal S1 S2, no S3 or S4 and no murmur, click or rub     ABDOMEN:  soft, nontender, no HSM or masses and bowel sounds normal     MS: extremities normal- no gross deformities noted, no evidence of inflammation in joints, FROM in all extremities.     SKIN: no suspicious lesions or rashes     NEURO: Normal strength and tone, sensory exam grossly normal, mentation intact and speech normal     PSYCH: mentation appears normal. and affect normal/bright     LYMPHATICS: No cervical adenopathy    Recent Labs   Lab Test 10/09/19  1127 02/28/19  1045   HGB 13.6 13.0    234     --    POTASSIUM 3.9  --    CR 0.68  --         PRE-OP Diagnostics:  Recent Results (from the past 24 hour(s)) "   Hemoglobin    Collection Time: 09/11/20  9:52 AM   Result Value Ref Range    Hemoglobin 13.4 11.7 - 15.7 g/dL     No EKG required, no history of coronary heart disease, significant arrhythmia, peripheral arterial disease or other structural heart disease.         Assessment & Plan   The proposed surgical procedure is considered LOW risk.    REVISED CARDIAC RISK INDEX  The patient has the following serious cardiovascular risks for perioperative complications:  No serious cardiac risks = 0 points    INTERPRETATION: 0 points: Class I (very low risk - 0.4% complication rate)         ICD-10-CM    1. Preop general physical exam  Z01.818 Hemoglobin   2. Ganglion cyst  M67.40    3. Need for vaccination  Z23 VACCINE ADMINISTRATION, INITIAL     HC ZOSTER VACCINE RECOMBINANT ADJUVANTED IM NJX   4. Need for prophylactic vaccination and inoculation against influenza  Z23 Vaccine Administration, Each Additional [07376]     INFLUENZA VACCINE IM > 6 MONTHS VALENT IIV4 [74698]     CANCELED: INFLUENZA QUAD, RECOMBINANT, P-FREE (RIV4) (FLUBLOCK) [87487]       The patient has the following additional risks and recommendations for perioperative complications:     - No identified additional risk factors other than previously addressed     MEDICATION INSTRUCTIONS:  Patient is to take all scheduled medications on the day of surgery    RECOMMENDATION:  APPROVAL GIVEN to proceed with proposed procedure, without further diagnostic evaluation.    Return in about 3 months (around 12/11/2020) for Annual Exam.    Signed Electronically by: Fatoumata Grissom PA-C    Copy of this evaluation report is provided to requesting physician.    [facility] Guidelines    Revised Cardiac Risk Index

## 2020-11-10 ENCOUNTER — VIRTUAL VISIT (OUTPATIENT)
Dept: FAMILY MEDICINE | Facility: OTHER | Age: 60
End: 2020-11-10

## 2020-11-10 NOTE — PROGRESS NOTES
"Date: 11/10/2020 10:37:04  Clinician: Ishaan Perdue  Clinician NPI: [id number]  Patient: Cass Vick  Patient : 1960  Patient Address: [address] [place] [id number]  Patient Phone: [phone]  Visit Protocol: URI  Patient Summary:  Cass is a 60 year old ( : 1960 ) female who initiated a [facility] Visit for COVID-19 (Coronavirus) evaluation and screening. When asked the question \"Please sign me up to receive news, health information and promotions. \", Cass responded \"Yes\".    Cass states her symptoms started gradually 5-6 days ago. After her symptoms started, they improved and then got worse again.   Her symptoms consist of rhinitis, facial pain or pressure, myalgia, malaise, a sore throat, ear pain, a headache, a cough, and nasal congestion. She is experiencing difficulty breathing due to nasal congestion but she is not short of breath.   Symptom details     Nasal secretions: The color of her mucus is clear.    Cough: Cass coughs a few times an hour and her cough is more bothersome at night. Phlegm does not come into her throat when she coughs. She does not believe her cough is caused by post-nasal drip.     Sore throat: Cass reports having mild throat pain (1-3 on a 10 point pain scale), does not have exudate on her tonsils, and can swallow liquids. She is not sure if the lymph nodes in her neck are enlarged. A rash has not appeared on the skin since the sore throat started.     Facial pain or pressure: The facial pain or pressure feels worse when bending over or leaning forward.     Headache: She states the headache is mild (1-3 on a 10 point pain scale).      Cass denies having vomiting, chills, teeth pain, ageusia, diarrhea, wheezing, fever, nausea, and anosmia. She also denies having recent facial or sinus surgery in the past 60 days and having a sinus infection within the past year.   Precipitating events  Within the past week, Cass has not been " exposed to someone with strep throat. She has not recently been exposed to someone with influenza. Cass has been in close contact with the following high risk individuals: children under the age of 5.   Pertinent COVID-19 (Coronavirus) information  Cass does not work or volunteer as healthcare worker or a . In the past 14 days, Cass has not worked or volunteered at a healthcare facility or group living setting.   In the past 14 days, she also has not lived in a congregate living setting.   Cass has had a close contact with a laboratory-confirmed COVID-19 patient within 14 days of symptom onset. She was not exposed at her work. Date Cass was exposed to the laboratory-confirmed COVID-19 patient: 11/03/2020   Additional information about contact with COVID-19 (Coronavirus) patient as reported by the patient (free text):  was exposed at work 10/29/20. No symptoms but tested positive 11/5/20. Has since developed mild symptoms and loss of taste and smell    Since December 2019, Cass has been tested for COVID-19 and has not had upper respiratory infection or influenza-like illness.      Result of COVID-19 test: Negative     Date of her COVID-19 test: 09/15/2020      Pertinent medical history  Cass has taken an antibiotic medication in the past month. Antibiotic details as reported by the patient (free text): Cephalexin 500mg daily 5 days   Infected internal lower stye right eye   Cass does not get yeast infections when she takes antibiotics.   Cass does not need a return to work/school note.   Weight: 115 lbs   Cass does not smoke or use smokeless tobacco.   Weight: 115 lbs    MEDICATIONS: Vitamin D3 oral, Kelly-C oral, escitalopram oxalate oral, Flonase Sensimist nasal, Zyrtec oral, ALLERGIES: erythromycin base, Vicodin  Clinician Response:  Dear Cass,   Your symptoms show that you may have coronavirus (COVID-19). This illness can cause fever, cough and  "trouble breathing. Many people get a mild case and get better on their own. Some people can get very sick.  What should I do?  We would like to test you for this virus.   1. Please call [phone] to schedule your visit. Explain that you were referred by [facility] to have a COVID-19 test. Be ready to share your [facility] visit ID number.  * If you need to schedule in [facility] please call [phone] or for [employer] [place] employees please call [phone].  * If you need to schedule in the [place] area please call [phone]. [place] employees call [phone].  The following will serve as your written order for this COVID Test, ordered by me, for the indication of suspected COVID [Z20.828]: The test will be ordered in [employer], our electronic health record, after you are scheduled. It will show as ordered and authorized by Tony Dean MD.  Order: COVID-19 (Coronavirus) PCR for SYMPTOMATIC testing from [facility].   2. When it's time for your COVID test:  Stay at least 6 feet away from others. (If someone will drive you to your test, stay in the backseat, as far away from the  as you can.)   Cover your mouth and nose with a mask, tissue or washcloth.  Go straight to the testing site. Don't make any stops on the way there or back.      3.Starting now: Stay home and away from others (self-isolate) until:   You've had no fever---and no medicine that reduces fever---for one full day (24 hours). And...   Your other symptoms have gotten better. For example, your cough or breathing has improved. And...   At least 10 days have passed since your symptoms started.       During this time, don't leave the house except for testing or medical care.   Stay in your own room, even for meals. Use your own bathroom if you can.   Stay away from others in your home. No hugging, kissing or shaking hands. No visitors.  Don't go to work, school or anywhere else.    Clean \"high touch\" surfaces often (doorknobs, counters, handles, etc.). Use a " household cleaning spray or wipes. You'll find a full list of  on the EPA website: www.epa.gov/pesticide-registration/list-n-disinfectants-use-against-sars-cov-2.   Cover your mouth and nose with a mask, tissue or washcloth to avoid spreading germs.  Wash your hands and face often. Use soap and water.  Caregivers in these groups are at risk for severe illness due to COVID-19:  o People 65 years and older  o People who live in a nursing home or long-term care facility  o People with chronic disease (lung, heart, cancer, diabetes, kidney, liver, immunologic)  o People who have a weakened immune system, including those who:   Are in cancer treatment  Take medicine that weakens the immune system, such as corticosteroids  Had a bone marrow or organ transplant  Have an immune deficiency  Have poorly controlled HIV or AIDS  Are obese (body mass index of 40 or higher)  Smoke regularly   o Caregivers should wear gloves while washing dishes, handling laundry and cleaning bedrooms and bathrooms.  o Use caution when washing and drying laundry: Don't shake dirty laundry, and use the warmest water setting that you can.  o For more tips, go to www.cdc.gov/coronavirus/2019-ncov/downloads/10Things.pdf.       How can I take care of myself?   Get lots of rest. Drink extra fluids (unless a doctor has told you not to).   Take Tylenol (acetaminophen) for fever or pain. If you have liver or kidney problems, ask your family doctor if it's okay to take Tylenol.   Adults can take either:    650 mg (two 325 mg pills) every 4 to 6 hours, or...   1,000 mg (two 500 mg pills) every 8 hours as needed.    Note: Don't take more than 3,000 mg in one day. Acetaminophen is found in many medicines (both prescribed and over-the-counter medicines). Read all labels to be sure you don't take too much.   For children, check the Tylenol bottle for the right dose. The dose is based on the child's age or weight.    If you have other health problems  (like cancer, heart failure, an organ transplant or severe kidney disease): Call your specialty clinic if you don't feel better in the next 2 days.       Know when to call 911. Emergency warning signs include:    Trouble breathing or shortness of breath Pain or pressure in the chest that doesn't go away Feeling confused like you haven't felt before, or not being able to wake up Bluish-colored lips or face.  Where can I get more information?   [facility] -- About COVID-19: [email or web address]   CDC -- What to Do If You're Sick: www.cdc.gov/coronavirus/2019-ncov/about/steps-when-sick.html   [facility] -- Ending Home Isolation: www.cdc.gov/coronavirus/2019-ncov/hcp/disposition-in-home-patients.html   [facility] -- Caring for Someone: www.cdc.gov/coronavirus/2019-ncov/if-you-are-sick/care-for-someone.html   [facility] -- Interim Guidance for Hospital Discharge to Home: www.[facility].mn./diseases/coronavirus/hcp/hospdischarge.pdf   [facility] clinical trials (COVID-19 research studies): clinicalaffairs.[facility]-clinical-trials    Below are the COVID-19 hotlines at the [facility] ([facility]). Interpreters are available.    For health questions: Call 127-126-7220 or 1-826.643.2117 (7 a.m. to 7 p.m.) For questions about schools and childcare: Call 573-946-8282 or 1-538.641.9981 (7 a.m. to 7 p.m.)    Diagnosis: Contact with and (suspected) exposure to other viral communicable diseases  Diagnosis ICD: Z20.828

## 2020-11-11 DIAGNOSIS — Z20.822 SUSPECTED COVID-19 VIRUS INFECTION: Primary | ICD-10-CM

## 2020-11-11 PROCEDURE — U0003 INFECTIOUS AGENT DETECTION BY NUCLEIC ACID (DNA OR RNA); SEVERE ACUTE RESPIRATORY SYNDROME CORONAVIRUS 2 (SARS-COV-2) (CORONAVIRUS DISEASE [COVID-19]), AMPLIFIED PROBE TECHNIQUE, MAKING USE OF HIGH THROUGHPUT TECHNOLOGIES AS DESCRIBED BY CMS-2020-01-R: HCPCS | Performed by: FAMILY MEDICINE

## 2020-11-12 LAB
SARS-COV-2 RNA SPEC QL NAA+PROBE: ABNORMAL
SPECIMEN SOURCE: ABNORMAL

## 2020-11-19 ENCOUNTER — E-VISIT (OUTPATIENT)
Dept: FAMILY MEDICINE | Facility: CLINIC | Age: 60
End: 2020-11-19
Payer: COMMERCIAL

## 2020-11-19 DIAGNOSIS — H00.013 HORDEOLUM EXTERNUM OF RIGHT EYE, UNSPECIFIED EYELID: Primary | ICD-10-CM

## 2020-11-19 DIAGNOSIS — H57.89 REDNESS OF EYE, RIGHT: ICD-10-CM

## 2020-11-19 PROCEDURE — 99422 OL DIG E/M SVC 11-20 MIN: CPT | Performed by: FAMILY MEDICINE

## 2020-11-19 RX ORDER — CIPROFLOXACIN HYDROCHLORIDE 3.5 MG/ML
1-2 SOLUTION/ DROPS TOPICAL EVERY 4 HOURS
Qty: 10 ML | Refills: 0 | Status: SHIPPED | OUTPATIENT
Start: 2020-11-19 | End: 2023-08-16

## 2020-11-19 NOTE — PATIENT INSTRUCTIONS
Thank you for choosing us for your care. I have placed an order for a prescription so that you can start treatment. View your full visit summary for details by clicking on the link below. Your pharmacist will able to address any questions you may have about the medication.     If you re not feeling better within 2-3 days, please schedule an appointment.  You can schedule an appointment right here in [place], or call [phone]  If the visit is for the same symptoms as your e-visit, we ll refund the cost of your e-visit if seen within seven days.    I placed the patient on ciprofloxacin eyedrops to use in her right eye every 4 hours while awake.  She may do that for up to 7 days.  She will let us know if this does not work.  Her case is a little bit more complicated at present because she just tested positive little over a week ago for COVID-19 and we honestly do not know if that is playing any part of this issue.

## 2021-01-01 NOTE — TELEPHONE ENCOUNTER
Would she be willing to start an eVisit and send a picture of her eye?  I dont like sending another antibiotic if the symptoms came back so quickly, I would at lease want to see it first.     Or she can do this via [facility] and get a response within an hour.     Quentin Adam PA-C     [Mother] : mother [Breast milk] : breast milk [Vitamins ___] : Patient takes [unfilled] vitamins daily [Normal] : Normal [In Bassinet/Crib] : sleeps in bassinet/crib [On back] : sleeps on back [No] : No cigarette smoke exposure [Co-sleeping] : no co-sleeping [Loose bedding, pillow, toys, and/or bumpers in crib] : no loose bedding, pillow, toys, and/or bumpers in crib [Exposure to electronic nicotine delivery system] : No exposure to electronic nicotine delivery system [de-identified] : josh

## 2021-01-15 ENCOUNTER — HEALTH MAINTENANCE LETTER (OUTPATIENT)
Age: 61
End: 2021-01-15

## 2021-08-03 ENCOUNTER — MYC MEDICAL ADVICE (OUTPATIENT)
Dept: FAMILY MEDICINE | Facility: CLINIC | Age: 61
End: 2021-08-03

## 2021-08-03 DIAGNOSIS — Z12.11 SCREENING FOR COLON CANCER: Primary | ICD-10-CM

## 2021-09-05 ENCOUNTER — HEALTH MAINTENANCE LETTER (OUTPATIENT)
Age: 61
End: 2021-09-05

## 2021-09-13 LAB — COLOGUARD-ABSTRACT: NEGATIVE

## 2021-09-22 NOTE — RESULT ENCOUNTER NOTE
Kan Ramon,    I just wanted to let you know that your lab results have been reviewed and are attached.    - Your Cologuard test (screen for colon cancer) was negative.    Please let me know if you have any questions and have a great week!    Sincerely,    Monica Grissom PA-C    [facility]  [address] [place] [address]  Clinic Phone: [phone]

## 2021-10-31 ENCOUNTER — HEALTH MAINTENANCE LETTER (OUTPATIENT)
Age: 61
End: 2021-10-31

## 2022-02-20 ENCOUNTER — HEALTH MAINTENANCE LETTER (OUTPATIENT)
Age: 62
End: 2022-02-20

## 2022-04-20 ENCOUNTER — NURSE TRIAGE (OUTPATIENT)
Dept: NURSING | Facility: CLINIC | Age: 62
End: 2022-04-20

## 2022-04-20 ENCOUNTER — E-VISIT (OUTPATIENT)
Dept: URGENT CARE | Facility: URGENT CARE | Age: 62
End: 2022-04-20
Payer: COMMERCIAL

## 2022-04-20 DIAGNOSIS — J01.90 ACUTE BACTERIAL SINUSITIS: Primary | ICD-10-CM

## 2022-04-20 DIAGNOSIS — B96.89 ACUTE BACTERIAL SINUSITIS: Primary | ICD-10-CM

## 2022-04-20 PROCEDURE — 99421 OL DIG E/M SVC 5-10 MIN: CPT | Performed by: PHYSICIAN ASSISTANT

## 2022-04-20 RX ORDER — DOXYCYCLINE HYCLATE 100 MG
100 TABLET ORAL 2 TIMES DAILY
Qty: 14 TABLET | Refills: 0 | Status: SHIPPED | OUTPATIENT
Start: 2022-04-20 | End: 2022-04-27

## 2022-04-20 RX ORDER — AMOXICILLIN 875 MG
875 TABLET ORAL 2 TIMES DAILY
Qty: 20 TABLET | Refills: 0 | Status: SHIPPED | OUTPATIENT
Start: 2022-04-20 | End: 2022-04-30

## 2022-04-20 RX ORDER — AZITHROMYCIN 250 MG/1
TABLET, FILM COATED ORAL
Qty: 6 TABLET | Refills: 0 | Status: SHIPPED | OUTPATIENT
Start: 2022-04-20 | End: 2023-08-15

## 2022-04-20 RX ORDER — FLUTICASONE PROPIONATE 50 MCG
1 SPRAY, SUSPENSION (ML) NASAL DAILY
Qty: 16 G | Refills: 0 | Status: SHIPPED | OUTPATIENT
Start: 2022-04-20

## 2022-04-20 NOTE — PATIENT INSTRUCTIONS
When to Use Antibiotics    Antibiotics are medicines used to treat infections caused by bacteria. They don t work for an illness caused by a virus. And they don't work for an allergic reaction. In fact, taking antibiotics for reasons other than an infection by bacteria can cause problems. You may have side effects from the medicine. And if you need an antibiotic in the future, it may not work well. This is because the bacteria can become immune to the medicine. You can also get a type of diarrhea that's hard to treat. This diarrhea is called C. diff.   When antibiotics likely won t help  Your healthcare provider won t usually give you antibiotics for the conditions listed below. You can help by not asking for them if you have:     A cold. This type of illness is caused by a virus. It can cause a runny nose, stuffed-up nose, sneezing, coughing, and headache. You may also have mild body aches and low fever. A cold gets better on its own in a few days to a week.    The flu (influenza). This is a respiratory illness caused by a virus. The flu usually goes away on its own in a week or so. It can cause fever, body aches, sore throat, and tiredness.    Bronchitis. This is an infection in the lungs. It is most often caused by a virus. You may have coughing, phlegm, body aches, and a low fever. A common type of bronchitis is known as a chest cold. This is called acute bronchitis. This often happens after you have a respiratory infection like a cold. Bronchitis can take weeks to go away. Antibiotics often don t help.    Most sore throats. Sore throats are most often caused by viruses. Your throat may feel scratchy or achy. It may hurt to swallow. You may also have a low fever and body aches. A sore throat usually gets better in a few days.    Most outer ear infections. An ear infection may be caused by a virus or bacteria. It causes pain in the ear. Antibiotics by mouth usually don t help. Low-dose antibiotic ear drops  work much better.    Some inner ear infections. An inner ear infection (otitis media) can be caused by a virus in the ear. It can also cause pain and a high fever. Most older children with low-grade fever don't need to be treated with antibiotics.    Most sinus infections. This is also known as sinusitis. This kind of infection causes sinus pain and swelling, and a runny nose. In most cases, it goes away on its own. Antibiotics don t make recovery quicker.    Allergic rhinitis. This is a set of symptoms caused by an allergic reaction. You may have sneezing, a runny nose, itchy or watery eyes, or a sore throat. Allergies are not treated with antibiotics.    Low fever. A mild fever that s less than 100.4 F (38 C) most likely doesn t need to be treated with antibiotics.   When antibiotics can help  Antibiotics can be used to treat:                                                       Strep throat. This is a throat infection caused by a certain type of bacteria. Symptoms of strep throat include a sore throat, white patches on the tonsils, red spots on the roof of the mouth, fever, body aches, and nausea and vomiting. Strep throat almost never causes a cough.    Urinary tract infection (UTI). This is an infection of the bladder and the tube that takes urine out of the body. It is caused by bacteria. It can cause burning pain and urine that s cloudy or tinted with blood. UTIs are very common. Antibiotics usually help treat them.    Some outer ear infections. In some cases, a healthcare provider may prescribe antibiotics by mouth for an ear infection. You may need a test to show the cause of the ear infection.    Some sinus infections. In some cases, your healthcare provider may give you antibiotics. He or she may first need to make sure your symptoms aren t caused by something else. This may be a virus, fungus, allergies, or air pollutants such as smoke.   Your healthcare provider may give you antibiotics if you have a  condition that can affect your immune system. This includes diabetes or cancer.  Self-care at home  If your infection can t be treated with antibiotics, you can take other steps to feel better. Try the remedies below. In general:     Rest and sleep as much as needed.    Drink water and other clear fluids.    Don t smoke. Stay away from smoke from other people.    Use over-the-counter medicine such as acetaminophen or ibuprofen to ease pain or fever, as directed by your healthcare provider.  To treat sinus pain or nasal stuffiness:    Put a warm, moist cloth on your face where you feel sinus pain or pressure.    Try a nasal spray with medicine or saline. Use as directed by your healthcare provider.    Breathe in steam from a hot shower.    Use a humidifier or cool mist vaporizer.   To quiet a cough:     Use a humidifier or cool mist vaporizer.    Breathe in steam from a hot shower.    Suck on cough lozenges.   To sooth a sore throat:     Suck on ice chips, frozen ice pops, or lozenges.    Use a sore throat spray.    Use a humidifier or cool mist vaporizer.    Gargle with saltwater.    Drink warm liquids.    Take ibuprofen to reduce swelling and pain.  To ease ear pain:     Hold a warm, moist washcloth on the ear for 10 minutes at a time.  [employer] last reviewed this educational content on 12/1/2019 2000-2021 [employer]. All rights reserved. This information is not intended as a substitute for professional medical care. Always follow your healthcare professional's instructions.        Dear Cass Vick    After reviewing your responses, I've been able to diagnose you with?a sinus infection caused by bacteria.?     Based on your responses and diagnosis, I have prescribed an oral antibiotic to treat your symptoms. I have sent this to your pharmacy.?     It is also important to stay well hydrated, get lots of rest and take over-the-counter decongestants,?tylenol?or ibuprofen if you?are able  to?take those medications per your primary care provider to help relieve discomfort.?     It is important that you take?all of?your prescribed medication even if your symptoms are improving after a few doses.? Taking?all of?your medicine helps prevent the symptoms from returning.?     If your symptoms worsen, you develop severe headache, vomiting, high fever (>102), or are not improving in 7 days, please contact your primary care provider for an appointment or visit any of our convenient Walk-in Care or Urgent Care Centers to be seen which can be found on our website?here.?     Thanks again for choosing?us?as your health care partner,?   ?  Aydee Mane PA-C?

## 2022-04-21 NOTE — TELEPHONE ENCOUNTER
Doesn't want levaquin, allergy to doxycycline. Needs new medication tonight.     FNA spoke to provider Aydee Mane and they'll addend the order for a zpack instead.     FNA relayed to patient. She is hesitant about zpack and would like FNA to ask about amoxacillin instead. It's on her allergy list but patient reports the swelling was from a cream. She as taken oral for dental procedures and been fine. Provider Alhaji agreed to changed the script again.   FNA relayed to patient and she will  tonight or first thing tomorrow.      Patient voiced understanding and will follow disposition.   Anika Pimentel, NIKI  [facility] Nurse Advisor         Reason for Disposition    [1] Caller has URGENT medication question about med that PCP or specialist prescribed AND [2] triager unable to answer question    Additional Information    Negative: Drug overdose and triager unable to answer question    Negative: Caller requesting information unrelated to medicine    Negative: Caller requesting a prescription for Strep throat and has a positive culture result    Negative: Rash while taking a medication or within 3 days of stopping it    Negative: Immunization reaction suspected    Negative: [1] Asthma and [2] having symptoms of asthma (cough, wheezing, etc.)    Negative: [1] Influenza symptoms AND [2] anti-viral med prescription request, such as Tamiflu    Negative: [1] Symptom of illness (e.g., headache, abdominal pain, earache, vomiting) AND [2] more than mild    Negative: MORE THAN A DOUBLE DOSE of a prescription or over-the-counter (OTC) drug    Negative: [1] DOUBLE DOSE (an extra dose or lesser amount) of over-the-counter (OTC) drug AND [2] any symptoms (e.g., dizziness, nausea, pain, sleepiness)    Negative: [1] DOUBLE DOSE (an extra dose or lesser amount) of prescription drug AND [2] any symptoms (e.g., dizziness, nausea, pain, sleepiness)    Negative: Took another person's prescription drug    Negative: [1] Pharmacy calling with  "prescription questions AND [2] triager unable to answer question    Negative: [1] Prescription not at pharmacy AND [2] was prescribed by PCP recently    Negative: [1] Request for URGENT new prescription or refill of \"essential\" medication (i.e., likelihood of harm to patient if not taken) AND [2] triager unable to fill per unit policy    Negative: Diabetes drug error or overdose (e.g., took wrong type of insulin or took extra dose)    Negative: [1] DOUBLE DOSE (an extra dose or lesser amount) of prescription drug AND [2] NO symptoms (Exception: a double dose of antibiotics)    Protocols used: MEDICATION QUESTION CALL-A-AH      "

## 2022-09-11 NOTE — MR AVS SNAPSHOT
After Visit Summary   3/28/2017    aCss Vick    MRN: [id number]           Patient Information     Date Of Birth          1960        Visit Information        Provider Department      3/28/2017 2:30 PM Erickson Mcmahon MD [facility]        Today's Diagnoses     Recurrent acute serous otitis media of right ear    -  1      Care Instructions    The right eardrum was just a little bit red after her ear wash.  I think the redness was actually from the washing.  Patient is nervous as she is traveling starting this Saturday on an airplane down to [place].  We made a compromise, and the fact that I gave her Septra to take for 5 days should she get ear pain on her flight.  She is having chronic sinus issues and may need to go back to see a different ear nose and throat specialist.  She will let me know when she returns how things went.        Follow-ups after your visit        Who to contact     If you have questions or need follow up information about today's clinic visit or your schedule please contact [facility] directly at [phone].  Normal or non-critical lab and imaging results will be communicated to you by AudioPixelshart, letter or phone within 4 business days after the clinic has received the results. If you do not hear from us within 7 days, please contact the clinic through [employer] or phone. If you have a critical or abnormal lab result, we will notify you by phone as soon as possible.  Submit refill requests through [employer] or call your pharmacy and they will forward the refill request to us. Please allow 3 business days for your refill to be completed.          Additional Information About Your Visit        AudioPixelshart Information     [employer] gives you secure access to your electronic health record. If you see a primary care provider, you can also send messages to your care team and make appointments. If you have  questions, please call your primary care clinic.  If you do not have a primary care provider, please call [phone] and they will assist you.        Care EveryWhere ID     This is your Care EveryWhere ID. This could be used by other organizations to access your [facility] medical records  [id number]        Your Vitals Were     Pulse Temperature Respirations BMI (Body Mass Index)          85 98.4  F (36.9  C) (Tympanic) 14 20.25 kg/m2         Blood Pressure from Last 3 Encounters:   03/28/17 114/70   03/21/17 120/70   12/05/16 128/76    Weight from Last 3 Encounters:   03/28/17 118 lb (53.5 kg)   03/21/17 118 lb (53.5 kg)   12/05/16 118 lb (53.5 kg)              Today, you had the following     No orders found for display         Today's Medication Changes          These changes are accurate as of: 3/28/17 11:59 PM.  If you have any questions, ask your nurse or doctor.               Start taking these medicines.        Dose/Directions    sulfamethoxazole-trimethoprim 400-80 MG per tablet   Commonly known as:  BACTRIM/SEPTRA   Used for:  Recurrent acute serous otitis media of right ear   Started by:  Erickson Mcmahon MD        Dose:  1 tablet   Take 1 tablet by mouth 2 times daily for 5 days   Quantity:  10 tablet   Refills:  0            Where to get your medicines      [id number] - [facility] [address]  [address] , [facility] [address]     Phone:  [phone]     sulfamethoxazole-trimethoprim 400-80 MG per tablet                Primary Care Provider Office Phone # Fax #    Erickson Mcmahon [phone]       [facility] [address] [facility] [address]        Thank you!     Thank you for choosing [facility]  for your care. Our goal is always to provide you with excellent care. Hearing back from our patients is one way we can continue to improve our services. Please  take a few minutes to complete the written survey that you may receive in the mail after your visit with us. Thank you!             Your Updated Medication List - Protect others around you: Learn how to safely use, store and throw away your medicines at www.disposemymeds.org.          This list is accurate as of: 3/28/17 11:59 PM.  Always use your most recent med list.                   Brand Name Dispense Instructions for use    CALCIUM-MAGNESIUM PO      Take 2 capsules by mouth daily       desloratadine 5 MG tablet    CLARINEX    30 tablet    Take 1 tablet (5 mg) by mouth daily       fluticasone 50 MCG/ACT spray    FLONASE    1 Package    Spray 2 sprays into both nostrils daily       levocetirizine 5 MG tablet    XYZAL    30 tablet    Take 1 tablet (5 mg) by mouth every evening       MIRALAX PO      Take by mouth as needed       sulfamethoxazole-trimethoprim 400-80 MG per tablet    BACTRIM/SEPTRA    10 tablet    Take 1 tablet by mouth 2 times daily for 5 days       TYLENOL ARTHRITIS EXT RELIEF OR      Take 500 mg by mouth daily       vitamin D 2000 UNITS Caps      Take 2 capsules by mouth daily       ZYRTEC 10 MG tablet   Generic drug:  cetirizine      Take 10 mg by mouth daily.          Yes

## 2022-10-22 ENCOUNTER — HEALTH MAINTENANCE LETTER (OUTPATIENT)
Age: 62
End: 2022-10-22

## 2022-11-22 ENCOUNTER — E-VISIT (OUTPATIENT)
Dept: URGENT CARE | Facility: CLINIC | Age: 62
End: 2022-11-22
Payer: COMMERCIAL

## 2022-11-22 DIAGNOSIS — J01.90 ACUTE SINUSITIS WITH SYMPTOMS > 10 DAYS: Primary | ICD-10-CM

## 2022-11-22 PROCEDURE — 99421 OL DIG E/M SVC 5-10 MIN: CPT | Performed by: EMERGENCY MEDICINE

## 2022-11-22 RX ORDER — LEVOFLOXACIN 500 MG/1
500 TABLET, FILM COATED ORAL DAILY
Qty: 7 TABLET | Refills: 0 | Status: SHIPPED | OUTPATIENT
Start: 2022-11-22 | End: 2022-11-29

## 2022-11-22 NOTE — PATIENT INSTRUCTIONS
Sinusitis (Antibiotic Treatment)    The sinuses are air-filled spaces within the bones of the face. They connect to the inside of the nose. Sinusitis is an inflammation of the tissue that lines the sinuses. Sinusitis can occur during a cold. It can also happen due to allergies to pollens and other particles in the air. Sinusitis can cause symptoms of sinus congestion and a feeling of fullness. A sinus infection causes fever, headache, and facial pain. There is often green or yellow fluid draining from the nose or into the back of the throat (post-nasal drip). You have been given antibiotics to treat this condition.   Home care    Take the full course of antibiotics as instructed. Don't stop taking them, even when you feel better.    Drink plenty of water, hot tea, and other liquids as directed by the healthcare provider. This may help thin nasal mucus. It also may help your sinuses drain fluids.    Heat may help soothe painful areas of your face. Use a towel soaked in hot water. Or,  the shower and direct the warm spray onto your face. Using a vaporizer along with a menthol rub at night may also help soothe symptoms.     An expectorant with guaifenesin may help thin nasal mucus and help your sinuses drain fluids. Talk with your provider or pharmacists before taking an over-the-counter (OTC) medicine if you have any questions about it or its side effects..    You can use an OTC decongestant, unless a similar medicine was prescribed to you. Nasal sprays work the fastest. Use one that contains phenylephrine or oxymetazoline. First blow your nose gently. Then use the spray. Don't use these medicines more often than directed on the label. If you do, your symptoms may get worse. You may also take pills that contain pseudoephedrine. Don t use products that combine multiple medicines. This is because side effects may be increased. Read labels. You can also ask the pharmacist for help. (People with high blood  pressure should not use decongestants. They can raise blood pressure.) Talk with your provider or pharmacist if you have any questions about the medicine..    OTC antihistamines may help if allergies contributed to your sinusitis. Talk with your provider or pharmacist if you have any questions about the medicine..    Don't use nasal rinses or irrigation during an acute sinus infection, unless your healthcare provider tells you to. Rinsing may spread the infection to other areas in your sinuses.    Use acetaminophen or ibuprofen to control pain, unless another pain medicine was prescribed to you. If you have chronic liver or kidney disease or ever had a stomach ulcer, talk with your healthcare provider before using these medicines. Never give aspirin to anyone under age 18 who is ill with a fever. It may cause severe liver damage.    Don't smoke. This can make symptoms worse.    Follow-up care  Follow up with your healthcare provider, or as advised.   When to seek medical advice  Call your healthcare provider if any of these occur:     Facial pain or headache that gets worse    Stiff neck    Unusual drowsiness or confusion    Swelling of your forehead or eyelids    Symptoms don't go away in 10 days    Vision problems, such as blurred or double vision    Fever of 100.4 F (38 C) or higher, or as directed by your healthcare provider  Call 911  Call 911 if any of these occur:     Seizure    Trouble breathing    Feeling dizzy or faint    Fingernails, skin or lips look blue, purple , or gray  Prevention  Here are steps you can take to help prevent an infection:     Keep good hand washing habits.    Don t have close contact with people who have sore throats, colds, or other upper respiratory infections.    Don t smoke, and stay away from secondhand smoke.    Stay up to date with of your vaccines.  [employer] last reviewed this educational content on 12/1/2019 2000-2021 [employer]. All rights reserved. This  information is not intended as a substitute for professional medical care. Always follow your healthcare professional's instructions.        Dear Cass Vick         Based on your responses and diagnosis, I have prescribed Levaquin.  to treat your symptoms. I have sent this to your pharmacy.?     It is also important to stay well hydrated, get lots of rest and take over-the-counter decongestants,?tylenol?or ibuprofen if you?are able to?take those medications per your primary care provider to help relieve discomfort.?     It is important that you take?all of?your prescribed medication even if your symptoms are improving after a few doses.? Taking?all of?your medicine helps prevent the symptoms from returning.?     If your symptoms worsen, you develop severe headache, vomiting, high fever (>102), or are not improving in 7 days, please contact your primary care provider for an appointment or visit any of our convenient Walk-in Care or Urgent Care Centers to be seen which can be found on our website?here.?     Thanks again for choosing?us?as your health care partner,?   ?  Kike Arana MD?

## 2023-04-01 ENCOUNTER — HEALTH MAINTENANCE LETTER (OUTPATIENT)
Age: 63
End: 2023-04-01

## 2023-08-16 ENCOUNTER — OFFICE VISIT (OUTPATIENT)
Dept: INTERNAL MEDICINE | Facility: CLINIC | Age: 63
End: 2023-08-16
Payer: COMMERCIAL

## 2023-08-16 VITALS
HEART RATE: 100 BPM | HEIGHT: 64 IN | TEMPERATURE: 98.4 F | WEIGHT: 123.1 LBS | BODY MASS INDEX: 21.02 KG/M2 | OXYGEN SATURATION: 95 %

## 2023-08-16 DIAGNOSIS — F33.41 RECURRENT MAJOR DEPRESSIVE DISORDER, IN PARTIAL REMISSION (H): ICD-10-CM

## 2023-08-16 DIAGNOSIS — R21 RASH: Primary | ICD-10-CM

## 2023-08-16 DIAGNOSIS — Z12.31 ENCOUNTER FOR SCREENING MAMMOGRAM FOR BREAST CANCER: ICD-10-CM

## 2023-08-16 DIAGNOSIS — F41.1 GAD (GENERALIZED ANXIETY DISORDER): ICD-10-CM

## 2023-08-16 PROCEDURE — 99214 OFFICE O/P EST MOD 30 MIN: CPT | Performed by: INTERNAL MEDICINE

## 2023-08-16 RX ORDER — ESCITALOPRAM OXALATE 5 MG/1
5 TABLET ORAL DAILY
Qty: 90 TABLET | Refills: 3 | Status: SHIPPED | OUTPATIENT
Start: 2023-08-16

## 2023-08-16 RX ORDER — PREDNISONE 20 MG/1
20 TABLET ORAL DAILY
COMMUNITY
End: 2023-08-16

## 2023-08-16 RX ORDER — PREDNISONE 20 MG/1
TABLET ORAL
Qty: 11 TABLET | Refills: 0 | Status: SHIPPED | OUTPATIENT
Start: 2023-08-16 | End: 2023-08-28

## 2023-08-16 RX ORDER — TRIAMCINOLONE ACETONIDE 1 MG/G
CREAM TOPICAL 2 TIMES DAILY PRN
Qty: 30 G | Refills: 0 | Status: SHIPPED | OUTPATIENT
Start: 2023-08-16

## 2023-08-16 NOTE — PROGRESS NOTES
"  ASSESSMENT/PLAN                                                      (R21) Rash  (primary encounter diagnosis)  Comment: suspect rash is related to recent weed pulling.  Plan: extended steroid taper prescribed; triamcinolone 0.1% cream as needed for itching; if symptoms worsen, change, or do not improve, patient to contact MD.      (Z12.31) Encounter for screening mammogram for breast cancer  Plan: screening mammogram ordered - patient to schedule.     (F33.41) Recurrent major depressive disorder, in partial remission (H)  (F41.1) TENA (generalized anxiety disorder)  Comment: previously well controlled on Lexapro 5 mg daily.  Plan: refills provided.    Katrina Julio MD   [facility]  [address] [address]  T: [phone], F: [phone]    SUBJECTIVE                                                      Cass Vick is a very pleasant 63 year old female who presents with a rash:    Started a week ago [place]. First involved the exposed V-neck portion of her upper chest and lower neck but has progressed to other areas including her lower torso, upper neck, and arms. Rash is very itchy. Responded well to 3-day course of prednisone 20 mg daily but worsened afterwards. No improvement with OTC topicals. Associated chills.    Unrelated to above, patient would like to restart Lexapro which she uses intermittently for anxiety and depression.    Also, unrelated to above, patient is due for screening mammogram.    OBJECTIVE                                                      Pulse 100   Temp 98.4  F (36.9  C)   Ht 1.626 m (5' 4\")   Wt 55.8 kg (123 lb 1.6 oz)   SpO2 95%   BMI 21.13 kg/m    Constitutional: well-appearing  Integumentary: multiple mildly erythematous, raised macules and patches involving upper chest, back, neck, abdomen, and inner elbows    ---    (Note documentation was completed, in part, with [employer] voice-recognition software. " Documentation was reviewed, but some grammatical, spelling, and word errors may remain.)

## 2023-11-05 ENCOUNTER — HEALTH MAINTENANCE LETTER (OUTPATIENT)
Age: 63
End: 2023-11-05

## 2024-06-02 ENCOUNTER — HEALTH MAINTENANCE LETTER (OUTPATIENT)
Age: 64
End: 2024-06-02

## 2024-10-03 DIAGNOSIS — Z12.11 COLON CANCER SCREENING: ICD-10-CM

## 2024-10-17 ENCOUNTER — ORDERS ONLY (AUTO-RELEASED) (OUTPATIENT)
Dept: ADMISSION | Facility: CLINIC | Age: 64
End: 2024-10-17

## 2024-10-17 DIAGNOSIS — Z12.11 COLON CANCER SCREENING: ICD-10-CM

## 2024-10-20 ENCOUNTER — HEALTH MAINTENANCE LETTER (OUTPATIENT)
Age: 64
End: 2024-10-20

## 2024-11-15 LAB — NONINV COLON CA DNA+OCC BLD SCRN STL QL: NEGATIVE

## (undated) RX ORDER — LIDOCAINE HYDROCHLORIDE 10 MG/ML
INJECTION, SOLUTION EPIDURAL; INFILTRATION; INTRACAUDAL; PERINEURAL
Status: DISPENSED
Start: 2017-07-25